# Patient Record
Sex: FEMALE | Race: WHITE | NOT HISPANIC OR LATINO | Employment: FULL TIME | URBAN - METROPOLITAN AREA
[De-identification: names, ages, dates, MRNs, and addresses within clinical notes are randomized per-mention and may not be internally consistent; named-entity substitution may affect disease eponyms.]

---

## 2023-05-17 PROBLEM — F33.8 SEASONAL DEPRESSION (CMS-HCC): Status: ACTIVE | Noted: 2023-05-17

## 2023-05-17 PROBLEM — M54.2 NECK PAIN ON RIGHT SIDE: Status: ACTIVE | Noted: 2023-05-17

## 2023-05-17 PROBLEM — F42.8 OBSESSIVE THINKING: Status: ACTIVE | Noted: 2023-05-17

## 2023-05-17 PROBLEM — E04.9 ENLARGED THYROID GLAND: Status: ACTIVE | Noted: 2023-05-17

## 2023-05-17 PROBLEM — R41.3 MEMORY DEFICIT: Status: ACTIVE | Noted: 2023-05-17

## 2023-05-17 PROBLEM — G89.29 CHRONIC LOWER BACK PAIN: Status: ACTIVE | Noted: 2023-05-17

## 2023-05-17 PROBLEM — R00.0 SINUS TACHYCARDIA: Status: ACTIVE | Noted: 2023-05-17

## 2023-05-17 PROBLEM — N63.10 MASS OF RIGHT BREAST: Status: ACTIVE | Noted: 2023-05-17

## 2023-05-17 PROBLEM — R51.9 HEAD PAIN: Status: ACTIVE | Noted: 2023-05-17

## 2023-05-17 PROBLEM — I72.0: Status: ACTIVE | Noted: 2023-05-17

## 2023-05-17 PROBLEM — R63.4 WEIGHT LOSS, UNINTENTIONAL: Status: ACTIVE | Noted: 2023-05-17

## 2023-05-17 PROBLEM — R79.89 ABNORMAL TSH: Status: ACTIVE | Noted: 2023-05-17

## 2023-05-17 PROBLEM — E05.10 TOXIC ADENOMA: Status: ACTIVE | Noted: 2023-05-17

## 2023-05-17 PROBLEM — M54.12 CERVICAL RADICULOPATHY: Status: ACTIVE | Noted: 2023-05-17

## 2023-05-17 PROBLEM — F41.9 ANXIETY: Status: ACTIVE | Noted: 2023-05-17

## 2023-05-17 PROBLEM — H61.20 CERUMEN IMPACTION: Status: ACTIVE | Noted: 2023-05-17

## 2023-05-17 PROBLEM — M54.12 CERVICAL NEURITIS: Status: ACTIVE | Noted: 2023-05-17

## 2023-05-17 PROBLEM — R29.898 RIGHT ARM WEAKNESS: Status: ACTIVE | Noted: 2023-05-17

## 2023-05-17 PROBLEM — R11.2 DRUG-INDUCED NAUSEA AND VOMITING: Status: ACTIVE | Noted: 2023-05-17

## 2023-05-17 PROBLEM — R21 RASH: Status: ACTIVE | Noted: 2023-05-17

## 2023-05-17 PROBLEM — E04.1 THYROID NODULE: Status: ACTIVE | Noted: 2023-05-17

## 2023-05-17 PROBLEM — R20.9 SENSORY DISTURBANCE: Status: ACTIVE | Noted: 2023-05-17

## 2023-05-17 PROBLEM — M54.50 CHRONIC LOWER BACK PAIN: Status: ACTIVE | Noted: 2023-05-17

## 2023-05-17 PROBLEM — M79.672 PAIN OF LEFT HEEL: Status: ACTIVE | Noted: 2023-05-17

## 2023-05-17 PROBLEM — E05.90 HYPERTHYROIDISM: Status: ACTIVE | Noted: 2023-05-17

## 2023-05-17 PROBLEM — G47.9 SLEEP DIFFICULTIES: Status: ACTIVE | Noted: 2023-05-17

## 2023-05-17 PROBLEM — L65.9 HAIR LOSS: Status: ACTIVE | Noted: 2023-05-17

## 2023-05-17 PROBLEM — T50.905A DRUG-INDUCED NAUSEA AND VOMITING: Status: ACTIVE | Noted: 2023-05-17

## 2023-05-17 PROBLEM — D64.9 ANEMIA: Status: ACTIVE | Noted: 2023-05-17

## 2023-05-17 PROBLEM — F33.9 MAJOR DEPRESSIVE DISORDER, RECURRENT EPISODE (CMS-HCC): Status: ACTIVE | Noted: 2023-05-17

## 2023-05-17 PROBLEM — R41.840 ATTENTION DEFICIT: Status: ACTIVE | Noted: 2023-05-17

## 2023-05-17 PROBLEM — R92.8 ABNORMAL MAMMOGRAM: Status: ACTIVE | Noted: 2023-05-17

## 2023-05-22 ENCOUNTER — APPOINTMENT (OUTPATIENT)
Dept: PRIMARY CARE | Facility: CLINIC | Age: 48
End: 2023-05-22
Payer: COMMERCIAL

## 2023-09-21 ENCOUNTER — OFFICE VISIT (OUTPATIENT)
Dept: PRIMARY CARE | Facility: CLINIC | Age: 48
End: 2023-09-21
Payer: COMMERCIAL

## 2023-09-21 VITALS
OXYGEN SATURATION: 98 % | HEIGHT: 68 IN | DIASTOLIC BLOOD PRESSURE: 80 MMHG | WEIGHT: 170.8 LBS | BODY MASS INDEX: 25.88 KG/M2 | SYSTOLIC BLOOD PRESSURE: 110 MMHG | TEMPERATURE: 97.5 F | HEART RATE: 80 BPM | RESPIRATION RATE: 16 BRPM

## 2023-09-21 DIAGNOSIS — J02.9 SORE THROAT: ICD-10-CM

## 2023-09-21 DIAGNOSIS — R68.89 FLU-LIKE SYMPTOMS: ICD-10-CM

## 2023-09-21 DIAGNOSIS — R13.10 PAINFUL SWALLOWING: ICD-10-CM

## 2023-09-21 DIAGNOSIS — J02.9 TONSILLOPHARYNGITIS: Primary | ICD-10-CM

## 2023-09-21 LAB — POC RAPID STREP: NEGATIVE

## 2023-09-21 PROCEDURE — 87081 CULTURE SCREEN ONLY: CPT

## 2023-09-21 PROCEDURE — 87880 STREP A ASSAY W/OPTIC: CPT | Performed by: NURSE PRACTITIONER

## 2023-09-21 PROCEDURE — 87636 SARSCOV2 & INF A&B AMP PRB: CPT

## 2023-09-21 PROCEDURE — 99202 OFFICE O/P NEW SF 15 MIN: CPT | Performed by: NURSE PRACTITIONER

## 2023-09-21 RX ORDER — DOCUSATE CALCIUM 240 MG
CAPSULE ORAL
COMMUNITY
End: 2023-10-19 | Stop reason: ALTCHOICE

## 2023-09-21 RX ORDER — ASPIRIN 81 MG/1
1 TABLET ORAL DAILY
COMMUNITY
End: 2024-04-03 | Stop reason: WASHOUT

## 2023-09-21 RX ORDER — AMOXICILLIN 500 MG/1
500 CAPSULE ORAL EVERY 8 HOURS SCHEDULED
Qty: 21 CAPSULE | Refills: 0 | Status: SHIPPED | OUTPATIENT
Start: 2023-09-21 | End: 2023-09-21

## 2023-09-21 RX ORDER — TRAZODONE HYDROCHLORIDE 50 MG/1
TABLET ORAL
COMMUNITY
Start: 2021-11-04

## 2023-09-21 RX ORDER — SERTRALINE HYDROCHLORIDE 50 MG/1
1.5 TABLET, FILM COATED ORAL DAILY
COMMUNITY
Start: 2022-09-29 | End: 2023-10-19 | Stop reason: DRUGHIGH

## 2023-09-21 RX ORDER — NAPROXEN SODIUM 220 MG
TABLET ORAL
COMMUNITY
Start: 2017-02-20 | End: 2023-10-19 | Stop reason: ALTCHOICE

## 2023-09-21 RX ORDER — SERTRALINE HYDROCHLORIDE 100 MG/1
1.5 TABLET, FILM COATED ORAL DAILY
COMMUNITY
Start: 2022-12-15 | End: 2023-10-19

## 2023-09-21 RX ORDER — BUPROPION HYDROCHLORIDE 300 MG/1
1 TABLET ORAL DAILY
COMMUNITY
Start: 2022-01-06 | End: 2023-10-19

## 2023-09-21 RX ORDER — CYCLOSPORINE 0.5 MG/ML
EMULSION OPHTHALMIC
COMMUNITY
Start: 2015-03-12

## 2023-09-21 ASSESSMENT — ENCOUNTER SYMPTOMS
LOSS OF SENSATION IN FEET: 0
COUGH: 1
HEADACHES: 1
DEPRESSION: 0
HOARSE VOICE: 1
TROUBLE SWALLOWING: 1
SWOLLEN GLANDS: 1
SORE THROAT: 1
OCCASIONAL FEELINGS OF UNSTEADINESS: 0

## 2023-09-21 ASSESSMENT — PATIENT HEALTH QUESTIONNAIRE - PHQ9
1. LITTLE INTEREST OR PLEASURE IN DOING THINGS: NOT AT ALL
2. FEELING DOWN, DEPRESSED OR HOPELESS: NOT AT ALL
SUM OF ALL RESPONSES TO PHQ9 QUESTIONS 1 AND 2: 0
1. LITTLE INTEREST OR PLEASURE IN DOING THINGS: NOT AT ALL
2. FEELING DOWN, DEPRESSED OR HOPELESS: NOT AT ALL
SUM OF ALL RESPONSES TO PHQ9 QUESTIONS 1 AND 2: 0

## 2023-09-21 NOTE — PROGRESS NOTES
Subjective   Patient ID: Suzy Bah is a 48 y.o. female who is with chief complaint of sore throat.    HPI  Patient is a 48 y.o. female who CONSULTED AT [facility] [place] [place] [facility] today. Patient is with complaints of slight nasal congestion, slight nasal discharge, headache / sinus pain, sore throat, painful swallowing, red swollen tonsils, slight cough, post nasal drip, and fatigue. She denies having muscle ache, loss of sense of taste, loss of sense of smell, diarrhea, chills nor fever. Patient states that present condition started yesterday. Patient denies history of recent travel, exposure to person/people who tested positive for COVID 19, nor exposure to person/people with flu like symptoms. she denies shortness of breath, chest pain, palpitations, nor edema. she stated that she  tried OTC medications which afforded only slight relief of symptoms. she denies nausea, vomiting, abdominal pain, nor any other symptoms.    Patient states she had her COVID vaccine.  Patient states she had the flu shot for this season.  Patient states she underwent testing for COVID yesterday and the result was NEGATIVE.     Review of Systems  General: no weight loss, generally healthy, (+) fatigue  Head: (+) headaches / sinus pain, no vertigo, no injury  Eyes: no diplopia, no tearing, no pain,   Ears: no change in hearing, no tinnitus, no bleeding, no vertigo  Mouth:  no dental difficulties, no gingival bleeding, (+) sore throat, no loss of sense of taste, (+) painful swallowing, (+) red swollen tonsils,   Nose: (+) mild congestion, (+) mild discharge, no bleeding, no obstruction, no loss of sense of smell  Neck: no stiffness, no pain, no tenderness, no masses, no bruit  Pulmonary: no dyspnea, no wheezing, no hemoptysis, no cough  Cardiovascular: no chest pain, no palpitations, no syncope, no orthopnea  Gastrointestinal: no change in appetite, no dysphagia, no abdominal pains, no diarrhea, no emesis, no melena  Genito  Urinary: no dysuria, no urinary urgency, no nocturia, no incontinence, no change in nature of urine  Musculoskeletal: no muscle ache, no joint pain, no limitation of range of motion, no paresthesia, no numbness  Constitutional: no fever, no chills, no night sweats    Objective   Physical Exam  General: ambulatory, in no acute distress  Head: normocephalic, no lesions, no sinus tenderness  Eyes: pink palpebral conjunctiva, anicteric sclerae, PERRLA, EOM's full  Ears: clear external auditory canals, no ear discharge, no bleeding from the ears, tympanic membrane intact  Nose: no congestion of nasal mucosa, no nasal discharge, no bleeding, no obstruction  Throat: (+) erythema, and (+) exudate on posterior pharyngeal wall, no lesion, (+) erythema and enlargement of both tonsils  Neck: supple, no masses, no bruits, no CLADP  Chest: symmetrical chest expansion, no lagging, no retractions, clear breath sounds, no rales, no wheezes  Extremities: full and equal peripheral pulses, no edema,    Assessment/Plan   Problem List Items Addressed This Visit    None  Visit Diagnoses       Tonsillopharyngitis    -  Primary    Relevant Medications    amoxicillin (Amoxil) 500 mg capsule    Other Relevant Orders    POCT rapid strep A manually resulted (Completed)    Group A Streptococcus, Culture    Flu-like symptoms        Relevant Orders    Influenza A, and B PCR    Sars-CoV-2 PCR, Symptomatic    Sore throat        Relevant Medications    amoxicillin (Amoxil) 500 mg capsule    Other Relevant Orders    POCT rapid strep A manually resulted (Completed)    Group A Streptococcus, Culture    Painful swallowing        Relevant Medications    amoxicillin (Amoxil) 500 mg capsule    Other Relevant Orders    POCT rapid strep A manually resulted (Completed)    Group A Streptococcus, Culture        Patient for COVID and flu testing.  Specimen collection done by MA  Patient tolerated procedure well  Will inform patient of result    rapid strep test  done  negative result discussed and explained to the patient  culture and sensitivity study of throat swab ordered and done  will call patient with result    DISCHARGE SUMMARY:   Patient was seen and examined. Diagnosis, treatment, treatment options, and possible complications of today's illness discussed and explained to patient. Patient to take medication/s associated with this visit. Patient may also take OTC analgesic/antipyretic if needed for pain/fever. Advised to increase oral fluid intake. Advised steam inhalation if needed to relieve congestion. Advised warm saline gargle if needed to relieve throat discomfort. Advised Listerine antiseptic mouthwash gargle TID. Patient may use Cepacol oral spray as needed to relieve throat discomfort. Patient was advised to discard the old toothbrush and use a new toothbrush beginning on the third of antibiotics. Advised to follow instructions with regards to COVID testing. Advised on social distancing and communicability prevention. Advised to come back if with worsening or persistent symptoms. Patient verbalized understanding of plan of care.    Patient to come back in 7 - 10 days if needed for worsening symptoms.

## 2023-09-21 NOTE — PATIENT INSTRUCTIONS
DISCHARGE SUMMARY:   Patient was seen and examined. Diagnosis, treatment, treatment options, and possible complications of today's illness discussed and explained to patient. Patient to take medication/s associated with this visit. Patient may also take OTC analgesic/antipyretic if needed for pain/fever. Advised to increase oral fluid intake. Advised steam inhalation if needed to relieve congestion. Advised warm saline gargle if needed to relieve throat discomfort. Advised Listerine antiseptic mouthwash gargle TID. Patient may use Cepacol oral spray as needed to relieve throat discomfort. Patient was advised to discard the old toothbrush and use a new toothbrush beginning on the third of antibiotics. Advised to follow instructions with regards to COVID testing. Advised on social distancing and communicability prevention. Advised to come back if with worsening or persistent symptoms. Patient verbalized understanding of plan of care.    Patient to come back in 7 - 10 days if needed for worsening symptoms.

## 2023-09-21 NOTE — PROGRESS NOTES
"Subjective   Patient ID: Suzy Bah is a 48 y.o. female who presents for Sore Throat.    Pt claims she did a at home COVID test it came back neg. 9/20/2023    Sore Throat   This is a new problem. The current episode started in the past 7 days. The problem has been gradually worsening. There has been no fever. Associated symptoms include congestion, coughing, ear pain, headaches, a hoarse voice, swollen glands and trouble swallowing. She has tried nothing for the symptoms. The treatment provided no relief.        Review of Systems   HENT:  Positive for congestion, ear pain, hoarse voice, sore throat and trouble swallowing.    Respiratory:  Positive for cough.    Neurological:  Positive for headaches.       Objective   /80   Pulse 80   Temp 36.4 °C (97.5 °F) (Temporal)   Resp 16   Ht 1.715 m (5' 7.5\")   Wt 77.5 kg (170 lb 12.8 oz)   SpO2 98%   BMI 26.36 kg/m²     Physical Exam    Assessment/Plan          "

## 2023-09-22 LAB
FLU A RESULT: NOT DETECTED
FLU B RESULT: NOT DETECTED
SARS-COV-2 RESULT: NOT DETECTED

## 2023-09-23 LAB — GROUP A STREP SCREEN, CULTURE: NORMAL

## 2023-09-26 ENCOUNTER — DOCUMENTATION (OUTPATIENT)
Dept: PRIMARY CARE | Facility: CLINIC | Age: 48
End: 2023-09-26
Payer: COMMERCIAL

## 2023-10-05 ENCOUNTER — HOSPITAL ENCOUNTER (OUTPATIENT)
Dept: CARDIOLOGY | Facility: HOSPITAL | Age: 48
Discharge: HOME | End: 2023-10-05
Payer: COMMERCIAL

## 2023-10-05 DIAGNOSIS — I72.0 ANEURYSM OF LEFT CAROTID ARTERY (CMS-HCC): ICD-10-CM

## 2023-10-05 DIAGNOSIS — I67.1 CEREBRAL ANEURYSM, NONRUPTURED (HHS-HCC): ICD-10-CM

## 2023-10-05 PROCEDURE — 93880 EXTRACRANIAL BILAT STUDY: CPT | Performed by: INTERNAL MEDICINE

## 2023-10-05 PROCEDURE — 93880 EXTRACRANIAL BILAT STUDY: CPT

## 2023-10-11 ENCOUNTER — PHARMACY VISIT (OUTPATIENT)
Dept: PHARMACY | Facility: CLINIC | Age: 48
End: 2023-10-11
Payer: COMMERCIAL

## 2023-10-11 PROCEDURE — RXMED WILLOW AMBULATORY MEDICATION CHARGE

## 2023-10-18 PROBLEM — F32.A DEPRESSION: Status: ACTIVE | Noted: 2023-10-18

## 2023-10-18 PROBLEM — Z86.79 HISTORY OF ANEURYSM: Status: ACTIVE | Noted: 2023-10-18

## 2023-10-18 PROBLEM — L82.1 OTHER SEBORRHEIC KERATOSIS: Status: ACTIVE | Noted: 2019-12-17

## 2023-10-18 PROBLEM — B07.9 VIRAL WART, UNSPECIFIED: Status: ACTIVE | Noted: 2019-12-17

## 2023-10-18 PROBLEM — G43.709 CHRONIC MIGRAINE WITHOUT AURA: Status: ACTIVE | Noted: 2023-10-18

## 2023-10-18 PROBLEM — L73.8 OTHER SPECIFIED FOLLICULAR DISORDERS: Status: ACTIVE | Noted: 2019-12-17

## 2023-10-18 PROBLEM — L65.9 NONSCARRING HAIR LOSS, UNSPECIFIED: Status: ACTIVE | Noted: 2019-12-17

## 2023-10-18 RX ORDER — TRETINOIN 0.25 MG/G
1 CREAM TOPICAL
COMMUNITY
Start: 2019-12-17 | End: 2023-10-19 | Stop reason: ALTCHOICE

## 2023-10-18 RX ORDER — FLAXSEED OIL 1000 MG
1 CAPSULE ORAL DAILY
COMMUNITY
Start: 2017-02-20 | End: 2023-10-19 | Stop reason: ALTCHOICE

## 2023-10-18 RX ORDER — BUPROPION HYDROCHLORIDE 150 MG/1
TABLET ORAL
COMMUNITY
Start: 2023-03-09 | End: 2023-10-19 | Stop reason: DRUGHIGH

## 2023-10-18 RX ORDER — ATOMOXETINE 40 MG/1
CAPSULE ORAL
COMMUNITY
Start: 2023-02-23 | End: 2023-10-19

## 2023-10-19 ENCOUNTER — TELEMEDICINE (OUTPATIENT)
Dept: BEHAVIORAL HEALTH | Facility: CLINIC | Age: 48
End: 2023-10-19
Payer: COMMERCIAL

## 2023-10-19 DIAGNOSIS — F42.8 OBSESSIVE THINKING: ICD-10-CM

## 2023-10-19 DIAGNOSIS — F33.1 MODERATE EPISODE OF RECURRENT MAJOR DEPRESSIVE DISORDER (MULTI): ICD-10-CM

## 2023-10-19 DIAGNOSIS — F41.9 ANXIETY: ICD-10-CM

## 2023-10-19 DIAGNOSIS — F33.8 SEASONAL DEPRESSION (CMS-HCC): ICD-10-CM

## 2023-10-19 DIAGNOSIS — G47.9 SLEEP DIFFICULTIES: ICD-10-CM

## 2023-10-19 DIAGNOSIS — R41.3 MEMORY DEFICIT: ICD-10-CM

## 2023-10-19 PROBLEM — F32.A DEPRESSION: Status: RESOLVED | Noted: 2023-10-18 | Resolved: 2023-10-19

## 2023-10-19 PROCEDURE — 99214 OFFICE O/P EST MOD 30 MIN: CPT | Performed by: NURSE PRACTITIONER

## 2023-10-19 ASSESSMENT — PATIENT HEALTH QUESTIONNAIRE - PHQ9
1. LITTLE INTEREST OR PLEASURE IN DOING THINGS: MORE THAN HALF THE DAYS
6. FEELING BAD ABOUT YOURSELF - OR THAT YOU ARE A FAILURE OR HAVE LET YOURSELF OR YOUR FAMILY DOWN: NOT AT ALL
8. MOVING OR SPEAKING SO SLOWLY THAT OTHER PEOPLE COULD HAVE NOTICED. OR THE OPPOSITE, BEING SO FIGETY OR RESTLESS THAT YOU HAVE BEEN MOVING AROUND A LOT MORE THAN USUAL: SEVERAL DAYS
9. THOUGHTS THAT YOU WOULD BE BETTER OFF DEAD, OR OF HURTING YOURSELF: NOT AT ALL
10. IF YOU CHECKED OFF ANY PROBLEMS, HOW DIFFICULT HAVE THESE PROBLEMS MADE IT FOR YOU TO DO YOUR WORK, TAKE CARE OF THINGS AT HOME, OR GET ALONG WITH OTHER PEOPLE: VERY DIFFICULT
4. FEELING TIRED OR HAVING LITTLE ENERGY: NEARLY EVERY DAY
7. TROUBLE CONCENTRATING ON THINGS, SUCH AS READING THE NEWSPAPER OR WATCHING TELEVISION: SEVERAL DAYS
2. FEELING DOWN, DEPRESSED OR HOPELESS: MORE THAN HALF THE DAYS
5. POOR APPETITE OR OVEREATING: SEVERAL DAYS
3. TROUBLE FALLING OR STAYING ASLEEP OR SLEEPING TOO MUCH: MORE THAN HALF THE DAYS

## 2023-10-19 ASSESSMENT — ANXIETY QUESTIONNAIRES
1. FEELING NERVOUS, ANXIOUS, OR ON EDGE: NOT AT ALL
6. BECOMING EASILY ANNOYED OR IRRITABLE: SEVERAL DAYS
7. FEELING AFRAID AS IF SOMETHING AWFUL MIGHT HAPPEN: NOT AT ALL
4. TROUBLE RELAXING: NOT AT ALL
3. WORRYING TOO MUCH ABOUT DIFFERENT THINGS: SEVERAL DAYS
GAD7 TOTAL SCORE: 3
5. BEING SO RESTLESS THAT IT IS HARD TO SIT STILL: NOT AT ALL
IF YOU CHECKED OFF ANY PROBLEMS ON THIS QUESTIONNAIRE, HOW DIFFICULT HAVE THESE PROBLEMS MADE IT FOR YOU TO DO YOUR WORK, TAKE CARE OF THINGS AT HOME, OR GET ALONG WITH OTHER PEOPLE: SOMEWHAT DIFFICULT
2. NOT BEING ABLE TO STOP OR CONTROL WORRYING: SEVERAL DAYS

## 2023-10-19 ASSESSMENT — ENCOUNTER SYMPTOMS
CARDIOVASCULAR NEGATIVE: 1
RESPIRATORY NEGATIVE: 1
GASTROINTESTINAL NEGATIVE: 1
DYSPHORIC MOOD: 1

## 2023-10-19 NOTE — PROGRESS NOTES
"Adult Ambulatory Psychiatry Progress Note      Assessment/Plan     Impression:  Suzy Bah is a 48 y.o. 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     Plan: No changes to tx plan or meds. Open to referral to neurology for memory deficits and attention issues that are not improving. Will discuss at next appt about increasing Zoloft if seasonal depression becomes stronger.  Medication: Continues Zoloft 150mg every day, Trazodone 25-50mg PRN/HS, Wellbutrin XL 300mg every day.     Patient is reminded that if there is SI to call [phone] and get themselves to the closest ED for evaluation, otherwise contact me for other questions/concerns.     Diagnoses and all orders for this visit:  Anxiety  Moderate episode of recurrent major depressive disorder (CMS/HCC)  Obsessive thinking  Seasonal depression (CMS/HCC)  Memory deficit  Sleep difficulties      Therapy: none    Other: n/a    Patient is reminded that if there is SI to call [phone] and get themselves to the closest ED for evaluation, otherwise contact me for other questions/concerns.     Subjective   HPI:  \"Both the patient, and family and caregivers and guardians as appropriate, were informed of the current need to conduct treatment via telephone. I have confirmed the patient's identity via the following (minimum of three) acceptable identifiers as per  Policy PH-9: , SS, home address.\"     Present Illness - anxiety, depression     Characteristics/Recent psychiatric symptoms (pertinent positives and negatives) - reports anxiety has been going 'better and managed' even with the EPIC conversion process finally done and implemented at work. Admits the first week was stressful but was able to make it through the first 2 weeks and as everyone in the department get " along well, there is support amongst one another and everyone feels comfortable asking each other for help when needed. Reports the depression 'had been ok with some days being different, and some days being better and other days being dark, but overall not horrible.' Reports noticing the cooler weather and rain for the past week has started to compound the seasonal depression feeling, but uses her natural light lamp when waking up. Reports sleep over the weekend was 'horrible where I did sleep until 2 in the afternoon.' Admits waking up in the AM, is 'not good at all. I do stick to my sleep routine. My brain won't turn off, and I do try to keep reading a book until I get sleepy, put it down, but then if I can't fall asleep because  of the obsessive thoughts, then I will open up the book again.' Reports 2-3 nights is when she is having issues falling asleep for the past 2-3 weeks, and is getting 6-7 hours. Most weekends she does get 8-9 hours but last weekend because of feeling unmotivated, mentally exhausted (having run out of her Wellbutrin and did not get to the pharmacy to refill it), was sleeping 10-12 hours. Reports energy levels continue to remain low and still experiences mental and physical fatigue but did notice without the Wellbutrin, they were worse than before, 'but now they are evening out.' Appetite is 'normal.' Reports 'no changes with memory issues and attention concerns' - no better, no worse. Reports her issues with memory are long term and short term recall. Also notices issues with forgetting words and expressing them in the moment. Reports 'not too bad during the daytime' with dealing with the mix of racing, obsessive, or any intrusive thoughts.      Onset/timeframe - 3 weeks  Type - anxiety, seasonal depression  Duration - situational  Aggravating and/or relieving factors/triggers - start of EPIC changeover, and changes in weather/season, but got worse over the past weekend when she ran out of  her Wellbutrin and did make it on time to the pharmacy to get it refilled, so only 2 days ago restarted it and feeling her mood re stabilize now.  Related symptoms  Treatment and treatment changes (new meds, dosage increases or decreases, med compliance, therapy frequency, etc.) (Past and Recent) - Zoloft 150mg QD, Trazodone 25-50mg PRN, Wellbutrin XL 300mg QD. Still looking for therapist.     Issues: Denies SI/HI/AVH currently.          Review of Systems   HENT: Negative.     Respiratory: Negative.     Cardiovascular: Negative.    Gastrointestinal: Negative.    Psychiatric/Behavioral:  Positive for dysphoric mood.          Objective   Mental Status Exam:  General Appearance: Well groomed, appropriate eye contact  Attitude/Behavior: Cooperative  Motor: No psychomotor agitation or retardation, no tremor or other abnormal movements  Speech: Normal rate, volume, prosody  Gait/Station: Other:(comment) (sitting at work desk over ZOOM)  Mood: 'doing alright if not feeling a little better now'  Affect: Euthymic, full-range, Congruent with mood and topic of conversation  Thought Process: Linear, goal directed  Thought Associations: No loosening of associations  Thought Content: Normal  Perception: No perceptual abnormalities noted  Sensorium: Alert and oriented to person, place, time and situation  Insight: Intact  Judgement: Intact  Cognition: Cognitively intact to conversational testing with respect to attention, orientation, fund of knowledge, recent and remote memory, and language  Testing: N/A    TRACI-7/PHQ-9 scores reviewed: 3, 12 compared to 4, 10 reflecting improved anxiety but a minor bounce in depressive symptoms.    Current Medications:  Current Outpatient Medications on File Prior to Visit   Medication Sig Dispense Refill    aspirin 81 mg EC tablet Take 1 tablet (81 mg) by mouth once daily.      ASPIRIN-ACETAMINOPHEN-CAFFEINE ORAL Take by mouth if needed. for migraine      buPROPion XL (Wellbutrin XL) 300 mg 24  hr tablet TAKE 1 TABLET BY MOUTH ONCE DAILY 90 tablet 3    Restasis 0.05 % ophthalmic emulsion Administer into affected eye(s).      sertraline (Zoloft) 100 mg tablet TAKE 1 1/2 TABLETS BY MOUTH ONCE DAILY 135 tablet 3    traZODone (Desyrel) 50 mg tablet Take by mouth.      [DISCONTINUED] docusate calcium (Stool Softener, docusate brian,) 240 mg capsule Take by mouth.      [DISCONTINUED] naproxen sodium (Aleve) 220 mg tablet Take by mouth.      BIOTIN ORAL Take 10,000 Units by mouth once daily.      [DISCONTINUED] amoxicillin (Amoxil) 500 mg capsule TAKE 1 CAPSULE (500 MG) BY MOUTH EVERY 8 HOURS FOR 7 DAYS. (Patient not taking: Reported on 10/19/2023) 21 capsule 0    [DISCONTINUED] atomoxetine (Strattera) 40 mg capsule       [DISCONTINUED] buPROPion XL (Wellbutrin XL) 150 mg 24 hr tablet       [DISCONTINUED] buPROPion XL (Wellbutrin XL) 300 mg 24 hr tablet Take 1 tablet (300 mg) by mouth once daily.      [DISCONTINUED] flaxseed oiL 1,000 mg capsule Take 1 capsule (1,000 mg) by mouth once daily.      [DISCONTINUED] FLAXSEED ORAL Take 1,000 mg by mouth once daily.      [DISCONTINUED] multivit-min/iron/folic acid/K (MULTI-DAY PLUS MINERALS ORAL) Take 1 tablet by mouth once daily.      [DISCONTINUED] sertraline (Zoloft) 100 mg tablet Take 1.5 tablets (150 mg) by mouth once daily.      [DISCONTINUED] sertraline (Zoloft) 50 mg tablet Take 1.5 tablets (75 mg) by mouth once daily.      [DISCONTINUED] tretinoin (Retin-A) 0.025 % cream 1 Application.       No current facility-administered medications on file prior to visit.       Lab Review:   not applicable      Time Spent:    Prep time: 1 min.  Direct patient time: 27 min.  Documentation time: 6 min.  Total time: 34 min.    Next Appointment:  Follow up in about 2 months (around 12/19/2023).

## 2023-10-20 ENCOUNTER — OFFICE VISIT (OUTPATIENT)
Dept: VASCULAR SURGERY | Facility: CLINIC | Age: 48
End: 2023-10-20
Payer: COMMERCIAL

## 2023-10-20 VITALS
WEIGHT: 169 LBS | DIASTOLIC BLOOD PRESSURE: 78 MMHG | SYSTOLIC BLOOD PRESSURE: 118 MMHG | HEIGHT: 68 IN | BODY MASS INDEX: 25.61 KG/M2 | HEART RATE: 102 BPM

## 2023-10-20 DIAGNOSIS — I72.0 ANEURYSM OF LEFT CAROTID ARTERY (CMS-HCC): Primary | ICD-10-CM

## 2023-10-20 PROCEDURE — 1036F TOBACCO NON-USER: CPT | Performed by: SURGERY

## 2023-10-20 PROCEDURE — 99213 OFFICE O/P EST LOW 20 MIN: CPT | Performed by: SURGERY

## 2023-10-20 NOTE — PROGRESS NOTES
History Of Present Illness  Suzy Bah is a 48 y.o. female presenting for a surveillance evaluation of her left ICA aneurysm repair with Dr. Vance from 2016. Her last visit was in 2020. She denies any lateralizing symptoms. She continues to take aspirin daily.      Past Medical History  She has a past medical history of Personal history of other mental and behavioral disorders.    Surgical History  She has a past surgical history that includes Eye surgery (09/10/2013); Breast biopsy (09/10/2013); Lumbar laminectomy (09/10/2013); Other surgical history (11/04/2021); Other surgical history (11/04/2021); Lumbar laminectomy (12/04/2013); and MR angio head wo IV contrast (1/13/2017).     Social History  She reports that she has never smoked. She has never used smokeless tobacco. No history on file for alcohol use and drug use.    Family History  Family History   Problem Relation Name Age of Onset    Other (htn) Father      Cancer Maternal Grandmother      Alcohol abuse Maternal Grandfather      Cancer Maternal Grandfather      Diabetes Maternal Grandfather      Heart disease Maternal Grandfather      Diabetes Paternal Grandmother      Heart disease Paternal Grandmother      Other (htn) Paternal Grandmother      Other (htn) Paternal Grandfather          Allergies  Patient has no known allergies.    Review of Systems   Constitutional: Negative.    HENT: Negative.     Eyes: Negative.    Respiratory:  Negative for cough and shortness of breath.    Cardiovascular:  Negative for chest pain and leg swelling.   Gastrointestinal: Negative.    Endocrine: Negative.    Genitourinary: Negative.    Musculoskeletal:  Negative for back pain and gait problem.   Skin:  Negative for color change, pallor and wound.   Neurological:  Negative for dizziness, syncope, speech difficulty, weakness, numbness and headaches.   Hematological:  Does not bruise/bleed easily.   Psychiatric/Behavioral: Negative.          Physical  "Exam  Constitutional:       General: She is not in acute distress.     Appearance: Normal appearance. She is normal weight.   HENT:      Head: Normocephalic and atraumatic.   Eyes:      Extraocular Movements: Extraocular movements intact.      Conjunctiva/sclera: Conjunctivae normal.      Pupils: Pupils are equal, round, and reactive to light.   Neck:      Vascular: No carotid bruit.   Cardiovascular:      Rate and Rhythm: Normal rate and regular rhythm.      Pulses: Normal pulses.      Heart sounds: Normal heart sounds.   Pulmonary:      Effort: Pulmonary effort is normal.      Breath sounds: Normal breath sounds.   Abdominal:      General: Abdomen is flat. Bowel sounds are normal.      Palpations: Abdomen is soft.   Musculoskeletal:         General: No swelling. Normal range of motion.      Cervical back: Normal range of motion. No tenderness.   Skin:     General: Skin is warm and dry.   Neurological:      General: No focal deficit present.      Mental Status: She is alert and oriented to person, place, and time.      Cranial Nerves: No cranial nerve deficit.      Sensory: No sensory deficit.      Motor: No weakness.   Psychiatric:         Mood and Affect: Mood normal.         Behavior: Behavior normal.          Last Recorded Vitals  Blood pressure 118/78, pulse 102, height 1.727 m (5' 8\"), weight 76.7 kg (169 lb).    Relevant Results      Current Outpatient Medications:     aspirin 81 mg EC tablet, Take 1 tablet (81 mg) by mouth once daily., Disp: , Rfl:     ASPIRIN-ACETAMINOPHEN-CAFFEINE ORAL, Take by mouth if needed. for migraine, Disp: , Rfl:     buPROPion XL (Wellbutrin XL) 300 mg 24 hr tablet, TAKE 1 TABLET BY MOUTH ONCE DAILY, Disp: 90 tablet, Rfl: 3    Restasis 0.05 % ophthalmic emulsion, Administer into affected eye(s)., Disp: , Rfl:     sertraline (Zoloft) 100 mg tablet, TAKE 1 1/2 TABLETS BY MOUTH ONCE DAILY, Disp: 135 tablet, Rfl: 3    traZODone (Desyrel) 50 mg tablet, Take by mouth., Disp: , Rfl:     " BIOTIN ORAL, Take 10,000 Units by mouth once daily., Disp: , Rfl:      Vascular US carotid artery duplex bilateral    Result Date: 10/6/2023            [facility] [place] [id number] [address] [place] [address]     Tel [phone] Fax [phone]  Vascular Lab Report  Carotid Artery Duplex Ultrasound Patient Name:      JHONNY KINNEY      Reading Physician:  [id number] Meredith Palacios MD, RPVI Study Date:        10/5/2023           Ordering Provider:  [id number] JASMYNE BRIDGES MRN/PID:           [id number]            Fellow: Accession#:        [id number]        Technologist:       TASHI Date of Birth/Age: 1975 / 48 years Technologist 2: Gender:            F                   Encounter#:         [id number] Admission Status:  Outpatient          Location Performed: [facility]  Diagnosis/ICD: Cerebral aneurysm, nonruptured-I67.1  Pertinent History: Hx LICA aneurysm repair 2017.  CONCLUSIONS:  Right Carotid: Findings are consistent with less than 50% stenosis of the right proximal internal carotid artery. Laminar flow seen by color Doppler. Right external carotid artery appears patent with no evidence of stenosis. The right vertebral artery is patent with antegrade flow. No evidence of hemodynamically significant stenosis in the right subclavian artery. Left Carotid: Findings are consistent with less than 50% stenosis of the left proximal internal carotid artery. Laminar flow seen by color Doppler. Left ICA transverse diameter 0.75 x 0.76 mm at bifurcation consistent with mild ectasia. Left external carotid artery appears patent with no evidence of stenosis. The left vertebral artery is patent with antegrade flow. No evidence of hemodynamically significant stenosis in the left subclavian artery.  Comparison: Compared with study from 10/19/2020, no significant change.Persistent mild carotid  ectasia. Imaging & Doppler Findings:  Right Plaque Morph: No plaque identified in the right carotid artery.  Left Plaque Morph: The distal left common carotid artery demonstrates intimal thickening plaque.   Right                        Left   PSV      EDV                PSV      EDV 99 cm/s  21 cm/s   CCA P    82 cm/s  24 cm/s 103 cm/s 19 cm/s   CCA D    73 cm/s  24 cm/s 93 cm/s  29 cm/s   ICA P    69 cm/s  30 cm/s 100 cm/s 44 cm/s   ICA M    77 cm/s  36 cm/s 117 cm/s 40 cm/s   ICA D    81 cm/s  31 cm/s 101 cm/s 16 cm/s    ECA     57 cm/s  13 cm/s 46 cm/s  14 cm/s Vertebral  41 cm/s  13 cm/s 155 cm/s         Subclavian 132 cm/s                Right Left ICA/CCA Ratio  0.9  0.9   [id number] Meredith Palacios MD, RPVI Electronically signed by [id number] Meredith Palacios MD, RPVI on 10/6/2023 at 11:46:04 AM  ** Final **        Assessment/Plan   Diagnoses and all orders for this visit:  Aneurysm of left carotid artery (CMS/HCC)  -     Vascular US Carotid Artery Duplex Bilateral; Future    47yo female with h/o left ICA aneurysm repair. She is doing well and denies any carotid symptoms. Recent carotid duplex reveals patent ICA's bilaterally with no e/o aneurysm or ectasia. She is to follow up in 2-3 years with a repeat carotid duplex.           Nata Aparicio MD.    Scribe Attestation  By signing my name below, I, Zurdo Wells   attest that this documentation has been prepared under the direction and in the presence of Nata Aparicio MD.

## 2023-10-21 ASSESSMENT — ENCOUNTER SYMPTOMS
CONSTITUTIONAL NEGATIVE: 1
BRUISES/BLEEDS EASILY: 0
DIZZINESS: 0
GASTROINTESTINAL NEGATIVE: 1
EYES NEGATIVE: 1
NUMBNESS: 0
SPEECH DIFFICULTY: 0
BACK PAIN: 0
HEADACHES: 0
COLOR CHANGE: 0
PSYCHIATRIC NEGATIVE: 1
SHORTNESS OF BREATH: 0
WEAKNESS: 0
COUGH: 0
ENDOCRINE NEGATIVE: 1
WOUND: 0

## 2023-10-26 ENCOUNTER — HOSPITAL ENCOUNTER (OUTPATIENT)
Dept: RADIOLOGY | Facility: HOSPITAL | Age: 48
Discharge: HOME | End: 2023-10-26
Payer: COMMERCIAL

## 2023-10-26 DIAGNOSIS — E05.10 THYROTOXICOSIS WITH TOXIC SINGLE THYROID NODULE WITHOUT THYROTOXIC CRISIS OR STORM: ICD-10-CM

## 2023-10-26 PROCEDURE — 76536 US EXAM OF HEAD AND NECK: CPT

## 2023-10-26 PROCEDURE — 76536 US EXAM OF HEAD AND NECK: CPT | Performed by: RADIOLOGY

## 2023-11-28 ENCOUNTER — LAB (OUTPATIENT)
Dept: LAB | Facility: LAB | Age: 48
End: 2023-11-28
Payer: COMMERCIAL

## 2023-11-28 DIAGNOSIS — E05.10 THYROTOXICOSIS WITH TOXIC SINGLE THYROID NODULE WITHOUT THYROTOXIC CRISIS OR STORM: Primary | ICD-10-CM

## 2023-11-28 LAB
T4 FREE SERPL-MCNC: 0.58 NG/DL (ref 0.61–1.12)
TSH SERPL-ACNC: 0.97 MIU/L (ref 0.44–3.98)

## 2023-11-28 PROCEDURE — 84481 FREE ASSAY (FT-3): CPT

## 2023-11-28 PROCEDURE — 84443 ASSAY THYROID STIM HORMONE: CPT

## 2023-11-28 PROCEDURE — 84439 ASSAY OF FREE THYROXINE: CPT

## 2023-11-28 PROCEDURE — 36415 COLL VENOUS BLD VENIPUNCTURE: CPT

## 2023-11-29 LAB — T3FREE SERPL-MCNC: 3.2 PG/ML (ref 2.3–4.2)

## 2023-11-30 ENCOUNTER — OFFICE VISIT (OUTPATIENT)
Dept: ENDOCRINOLOGY | Facility: CLINIC | Age: 48
End: 2023-11-30
Payer: COMMERCIAL

## 2023-11-30 VITALS
HEIGHT: 68 IN | WEIGHT: 170 LBS | DIASTOLIC BLOOD PRESSURE: 77 MMHG | SYSTOLIC BLOOD PRESSURE: 120 MMHG | BODY MASS INDEX: 25.76 KG/M2

## 2023-11-30 DIAGNOSIS — E55.9 VITAMIN D DEFICIENCY: ICD-10-CM

## 2023-11-30 DIAGNOSIS — E04.1 THYROID NODULE: ICD-10-CM

## 2023-11-30 DIAGNOSIS — E05.90 HYPERTHYROIDISM: Primary | ICD-10-CM

## 2023-11-30 PROCEDURE — 99214 OFFICE O/P EST MOD 30 MIN: CPT | Performed by: HOSPITALIST

## 2023-11-30 PROCEDURE — 1036F TOBACCO NON-USER: CPT | Performed by: HOSPITALIST

## 2023-11-30 RX ORDER — LANOLIN ALCOHOL/MO/W.PET/CERES
100 CREAM (GRAM) TOPICAL DAILY
COMMUNITY

## 2023-11-30 RX ORDER — IBUPROFEN 100 MG/5ML
1000 SUSPENSION, ORAL (FINAL DOSE FORM) ORAL DAILY
COMMUNITY
End: 2024-04-03 | Stop reason: WASHOUT

## 2023-11-30 ASSESSMENT — ENCOUNTER SYMPTOMS
TROUBLE SWALLOWING: 0
ABDOMINAL PAIN: 0
VOICE CHANGE: 0
FREQUENCY: 0
PALPITATIONS: 1
DYSURIA: 0
ARTHRALGIAS: 0
VOMITING: 0
AGITATION: 0
LIGHT-HEADEDNESS: 0
ABDOMINAL DISTENTION: 0
CHEST TIGHTNESS: 0
SORE THROAT: 0
HEADACHES: 0
EYE ITCHING: 0
NERVOUS/ANXIOUS: 1
NAUSEA: 0
DIARRHEA: 0
FATIGUE: 1
SHORTNESS OF BREATH: 0
TREMORS: 0
SLEEP DISTURBANCE: 0
CONSTIPATION: 0
PHOTOPHOBIA: 0

## 2023-11-30 NOTE — PROGRESS NOTES
Subjective   Patient ID: Suzy Bah is a 48 y.o. female who presents for New Patient Visit (Transition from Dr. Gutierrez) and Hyperthyroidism (PCP: Gross/Dx toxic adenoma 6mths to 1 year /[facility] thyroid: none known ).  HPI  See AP     Review of Systems   Constitutional:  Positive for fatigue.   HENT:  Negative for sore throat, trouble swallowing and voice change.    Eyes:  Negative for photophobia, itching and visual disturbance.   Respiratory:  Negative for chest tightness and shortness of breath.    Cardiovascular:  Positive for palpitations. Negative for chest pain.   Gastrointestinal:  Negative for abdominal distention, abdominal pain, constipation, diarrhea, nausea and vomiting.   Endocrine: Negative for cold intolerance, heat intolerance and polyuria.   Genitourinary:  Negative for dysuria and frequency.   Musculoskeletal:  Negative for arthralgias.   Skin:  Negative for pallor.   Allergic/Immunologic: Negative for environmental allergies.   Neurological:  Negative for tremors, light-headedness and headaches.   Psychiatric/Behavioral:  Negative for agitation and sleep disturbance. The patient is nervous/anxious.        Objective   Physical Exam  Constitutional:       Appearance: Normal appearance.   HENT:      Head: Normocephalic.      Comments: [place] positive      Nose: Nose normal.      Mouth/Throat:      Mouth: Mucous membranes are moist.   Eyes:      Extraocular Movements: Extraocular movements intact.   Neck:      Comments: Thyroid ~ 30g, nodular  Cardiovascular:      Rate and Rhythm: Normal rate.   Pulmonary:      Effort: Pulmonary effort is normal. No respiratory distress.   Abdominal:      General: There is no distension.   Musculoskeletal:         General: Normal range of motion.      Cervical back: Normal range of motion and neck supple.   Skin:     General: Skin is warm and dry.   Neurological:      Mental Status: She is alert and oriented to person, place, and time.      Deep Tendon Reflexes:  Reflexes normal.      Comments: No tremors    Psychiatric:         Mood and Affect: Mood normal.         Assessment/Plan   Diagnoses and all orders for this visit:  Hyperthyroidism  -     Thyroid Stimulating Hormone; Future  -     T4, free; Future  -     T3; Future  -     Thyroid stimulating immunoglobulin; Future  Thyroid nodule  -     US thyroid; Future  Vitamin D deficiency  -     Vitamin D 25-Hydroxy,Total (for eval of Vitamin D levels); Future         47 yo woman came to [facility] care for toxic adenoma.      She mentions she initially started having symptoms of hyperthyroidism early 2022- fatigue weight loss, palpitations.   Jan 2022 TSH 0.27 nl free T4 and T3   US thyroid 2/ 2022- [facility] with dominant left thyroid nodule ~ 2.9 cm   FNA biopsy left nodule- 3/ 2022- benign follicular nodule.   She saw Dr. Gutierrez 4/ 2022  UPTAKE scan 6 / 2022 - anterior neck uptake 27 %   Increased uptake mid pole left thyroid - likely hot nodule ( the one that was biopsied)    TSI ab neg   She mentions she was clinically observed , no BB or MMI or ROWE     Currently she mentions she fels well, palpitations once a week , it was daily in past   She mentions energy is fair. She has regular menstruations. Sleep - okay, no cramps .      10/ 2023- US thyroid -[facility] : R- 0.9 cm TR 3- not changed , left mid pole 1.4 cm TR 2 , no sig change, L inf lobe ~ 2.6 cm no sig change TR 4   11/28/23- TSH 0.97 free T4 0.58 T3- 3.2    No biotin use now. Stopped in past      Denies any eye symptoms   Clinically euthyroid      PLAN :   - discussed continued clinical and biochemical follow up   - advised HR monitoring when she has symptoms, hold off BB at this time   - discussed possible ROWE in case of symptoms of hyperthyroidism   - US thyroid 10/ 2024.   - discussed NH of toxic adenoma.     RTC  6m      FH- no known thyroid disorder   SH - lives in 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

## 2023-12-07 ENCOUNTER — TELEMEDICINE (OUTPATIENT)
Dept: BEHAVIORAL HEALTH | Facility: CLINIC | Age: 48
End: 2023-12-07
Payer: COMMERCIAL

## 2023-12-07 ENCOUNTER — LAB REQUISITION (OUTPATIENT)
Dept: LAB | Facility: HOSPITAL | Age: 48
End: 2023-12-07
Payer: COMMERCIAL

## 2023-12-07 DIAGNOSIS — F41.9 ANXIETY: ICD-10-CM

## 2023-12-07 DIAGNOSIS — F33.1 MODERATE EPISODE OF RECURRENT MAJOR DEPRESSIVE DISORDER (MULTI): ICD-10-CM

## 2023-12-07 DIAGNOSIS — G47.9 SLEEP DIFFICULTIES: ICD-10-CM

## 2023-12-07 DIAGNOSIS — R41.840 ATTENTION DEFICIT: ICD-10-CM

## 2023-12-07 DIAGNOSIS — F33.8 SEASONAL DEPRESSION (CMS-HCC): Primary | ICD-10-CM

## 2023-12-07 DIAGNOSIS — F42.8 OBSESSIVE THINKING: ICD-10-CM

## 2023-12-07 LAB — SARS-COV-2 RNA RESP QL NAA+PROBE: DETECTED

## 2023-12-07 PROCEDURE — 99214 OFFICE O/P EST MOD 30 MIN: CPT | Performed by: NURSE PRACTITIONER

## 2023-12-07 PROCEDURE — 87635 SARS-COV-2 COVID-19 AMP PRB: CPT

## 2023-12-07 RX ORDER — SERTRALINE HYDROCHLORIDE 100 MG/1
200 TABLET, FILM COATED ORAL DAILY
Qty: 240 TABLET | Refills: 0 | Status: SHIPPED | OUTPATIENT
Start: 2023-12-07 | End: 2024-05-12 | Stop reason: SDUPTHER

## 2023-12-07 ASSESSMENT — ENCOUNTER SYMPTOMS
CARDIOVASCULAR NEGATIVE: 1
GASTROINTESTINAL NEGATIVE: 1

## 2023-12-07 ASSESSMENT — PATIENT HEALTH QUESTIONNAIRE - PHQ9
3. TROUBLE FALLING OR STAYING ASLEEP OR SLEEPING TOO MUCH: SEVERAL DAYS
5. POOR APPETITE OR OVEREATING: SEVERAL DAYS
9. THOUGHTS THAT YOU WOULD BE BETTER OFF DEAD, OR OF HURTING YOURSELF: NOT AT ALL
8. MOVING OR SPEAKING SO SLOWLY THAT OTHER PEOPLE COULD HAVE NOTICED. OR THE OPPOSITE, BEING SO FIGETY OR RESTLESS THAT YOU HAVE BEEN MOVING AROUND A LOT MORE THAN USUAL: SEVERAL DAYS
1. LITTLE INTEREST OR PLEASURE IN DOING THINGS: MORE THAN HALF THE DAYS
10. IF YOU CHECKED OFF ANY PROBLEMS, HOW DIFFICULT HAVE THESE PROBLEMS MADE IT FOR YOU TO DO YOUR WORK, TAKE CARE OF THINGS AT HOME, OR GET ALONG WITH OTHER PEOPLE: SOMEWHAT DIFFICULT
7. TROUBLE CONCENTRATING ON THINGS, SUCH AS READING THE NEWSPAPER OR WATCHING TELEVISION: MORE THAN HALF THE DAYS
2. FEELING DOWN, DEPRESSED OR HOPELESS: MORE THAN HALF THE DAYS
6. FEELING BAD ABOUT YOURSELF - OR THAT YOU ARE A FAILURE OR HAVE LET YOURSELF OR YOUR FAMILY DOWN: SEVERAL DAYS
4. FEELING TIRED OR HAVING LITTLE ENERGY: NEARLY EVERY DAY

## 2023-12-07 ASSESSMENT — ANXIETY QUESTIONNAIRES
7. FEELING AFRAID AS IF SOMETHING AWFUL MIGHT HAPPEN: NOT AT ALL
3. WORRYING TOO MUCH ABOUT DIFFERENT THINGS: SEVERAL DAYS
1. FEELING NERVOUS, ANXIOUS, OR ON EDGE: NOT AT ALL
4. TROUBLE RELAXING: SEVERAL DAYS
6. BECOMING EASILY ANNOYED OR IRRITABLE: SEVERAL DAYS
IF YOU CHECKED OFF ANY PROBLEMS ON THIS QUESTIONNAIRE, HOW DIFFICULT HAVE THESE PROBLEMS MADE IT FOR YOU TO DO YOUR WORK, TAKE CARE OF THINGS AT HOME, OR GET ALONG WITH OTHER PEOPLE: SOMEWHAT DIFFICULT
GAD7 TOTAL SCORE: 3
2. NOT BEING ABLE TO STOP OR CONTROL WORRYING: NOT AT ALL
5. BEING SO RESTLESS THAT IT IS HARD TO SIT STILL: NOT AT ALL

## 2023-12-07 NOTE — PROGRESS NOTES
"Adult Ambulatory Psychiatry Progress Note      Assessment/Plan     Impression:  Suzy Bah is a 48 y.o. 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 At home on the weekends, with housework I have not been able to get anything done. On taiwo days, I feel happier and get things done but like today where it is gloomier out, I am just not motivated.\"    Plan: Reviewed and agreed to increasing Zoloft for SAD but leave other medications and treatment plan in place.    Medication: Increases Zoloft 150mg to 200mg every day. Trazodone 25-50mg PRN/HS, Wellbutrin XL 300mg every day.     Patient is reminded that if there is SI to call [phone] and get themselves to the closest ED for evaluation, otherwise contact me for other questions/concerns.     Diagnoses and all orders for this visit:  Seasonal depression (CMS/HCC)  -     sertraline (Zoloft) 100 mg tablet; Take 2 tablets (200 mg) by mouth once daily.  Anxiety  -     sertraline (Zoloft) 100 mg tablet; Take 2 tablets (200 mg) by mouth once daily.  Obsessive thinking  -     sertraline (Zoloft) 100 mg tablet; Take 2 tablets (200 mg) by mouth once daily.  Moderate episode of recurrent major depressive disorder (CMS/HCC)  Attention deficit  Sleep difficulties      Therapy: none    Other: n/a    Patient is reminded that if there is SI to call [phone] and get themselves to the closest ED for evaluation, otherwise contact me for other questions/concerns.     Subjective   HPI:  \"Both the patient, and family and caregivers and guardians as appropriate, were informed of the current need to conduct treatment via telephone. I have confirmed the patient's identity via the following (minimum of three) acceptable identifiers as per  Policy PH-9: , SS, home address.\"     Present Illness - depression     Characteristics/Recent psychiatric symptoms (pertinent " "positives and negatives) - reports anxiety has been stable overall and has no concerns. Depression is more impacted by the seasonal changes - colder and rozina out and the time change in the fall which has it darker out longer - has a lingering impact on her mood. Notices that when the sun is out she feels happier and more motivated to do things, especially on weekends and doing tasks at home, like cleaning up her house.  Reports the mixture of racing, obsessive thoughts are 'still there if not a bit less, and if I do experience them, I do try to move pass them and move on.' Reports those thoughts impact her after work and during her quiet time over dinner and showering before bed, \"I will find myself ruminating about things in the past, and thinking about what happened today and what to expect for tomorrow.\" Reports makes effort to stick to a sleep routine and with reading a book, 'it helps ease my mind' and will fall asleep within 20-30 minutes. Weeknights gets 5-6 hrs and weekends will get 8-10 hrs. Admits to not being a 'morning person' so her energy levels start out low and will eventually rise through the day and are sustainable for the day, but by the end of the day, they will plummet and she will feel herself mentally and physically fatigued. Appetite is 'ok. I am trying to eat better. I have gained a lot of weight - as I am trying to stay away from the desserts so trying to eat a little better.' Reports her memory issues and attention concerns 'are right now, not bad. They have not been so bad the past few weeks.'      Onset/timeframe - 4 weeks  Type - seasonal depression  Duration - daily  Aggravating and/or relieving factors/triggers - changes in weather/season, darker out, and notices how her mood improves when taiwo out   Related symptoms  Treatment and treatment changes (new meds, dosage increases or decreases, med compliance, therapy frequency, etc.) (Past and Recent) - Zoloft 150mg QD, Trazodone " 25-50mg PRN, Wellbutrin XL 300mg QD. Still looking for therapist.     Issues: Denies SI/HI/AVH currently.          Review of Systems   Cardiovascular: Negative.    Gastrointestinal: Negative.    Genitourinary: Negative.    Musculoskeletal: Negative.    Psychiatric/Behavioral: Negative.           Objective   Mental Status Exam:  General Appearance: Well groomed, appropriate eye contact  Attitude/Behavior: Cooperative  Motor: No psychomotor agitation or retardation, no tremor or other abnormal movements  Speech: Normal rate, volume, prosody  Gait/Station: WFL - Within functional limits  Mood: 'tired, if not a little down'  Affect: Constricted, Blunted, Congruent with mood and topic of conversation  Thought Process: Linear, goal directed  Thought Associations: No loosening of associations  Thought Content: Normal  Perception: No perceptual abnormalities noted  Sensorium: Alert and oriented to person, place, time and situation  Insight: Intact  Judgement: Intact  Cognition: Cognitively intact to conversational testing with respect to attention, orientation, fund of knowledge, recent and remote memory, and language  Testing: N/A    TRACI-7/PHQ-9 scores reviewed: 3, 12 compared to 3, 12 reflecting stable anxiety and moderate depression.    Current Medications:  Current Outpatient Medications on File Prior to Visit   Medication Sig Dispense Refill    buPROPion XL (Wellbutrin XL) 300 mg 24 hr tablet TAKE 1 TABLET BY MOUTH ONCE DAILY 90 tablet 3    traZODone (Desyrel) 50 mg tablet Take by mouth.      [DISCONTINUED] sertraline (Zoloft) 100 mg tablet TAKE 1 1/2 TABLETS BY MOUTH ONCE DAILY 135 tablet 3    ascorbic acid (Vitamin C) 1,000 mg tablet Take 1 tablet (1,000 mg) by mouth once daily.      aspirin 81 mg EC tablet Take 1 tablet (81 mg) by mouth once daily.      ASPIRIN-ACETAMINOPHEN-CAFFEINE ORAL Take by mouth if needed. for migraine      cyanocobalamin (Vitamin B-12) 1,000 mcg tablet Take 100 mcg by mouth once daily.       MINOXIDIL TOP Apply topically.      Restasis 0.05 % ophthalmic emulsion Administer into affected eye(s).       No current facility-administered medications on file prior to visit.       Lab Review:   not applicable      Time Spent:    Prep time: 1 min.  Direct patient time: 25 min.  Documentation time: 6 min.  Total time: 32 min.    Next Appointment:  Follow up in about 6 weeks (around 1/18/2024).

## 2023-12-09 ASSESSMENT — ENCOUNTER SYMPTOMS
MUSCULOSKELETAL NEGATIVE: 1
PSYCHIATRIC NEGATIVE: 1

## 2023-12-28 PROCEDURE — RXMED WILLOW AMBULATORY MEDICATION CHARGE

## 2024-01-05 PROCEDURE — RXMED WILLOW AMBULATORY MEDICATION CHARGE

## 2024-01-08 ENCOUNTER — PHARMACY VISIT (OUTPATIENT)
Dept: PHARMACY | Facility: CLINIC | Age: 49
End: 2024-01-08
Payer: COMMERCIAL

## 2024-01-18 ENCOUNTER — TELEMEDICINE (OUTPATIENT)
Dept: BEHAVIORAL HEALTH | Facility: CLINIC | Age: 49
End: 2024-01-18
Payer: COMMERCIAL

## 2024-01-18 DIAGNOSIS — F42.8 OBSESSIVE THINKING: ICD-10-CM

## 2024-01-18 DIAGNOSIS — R41.3 MEMORY IMPAIRMENT: ICD-10-CM

## 2024-01-18 DIAGNOSIS — F33.0 MILD EPISODE OF RECURRENT MAJOR DEPRESSIVE DISORDER (CMS-HCC): ICD-10-CM

## 2024-01-18 DIAGNOSIS — R41.840 ATTENTION DISTURBANCE: ICD-10-CM

## 2024-01-18 DIAGNOSIS — F41.9 ANXIETY: ICD-10-CM

## 2024-01-18 DIAGNOSIS — F33.8 SEASONAL DEPRESSION (CMS-HCC): ICD-10-CM

## 2024-01-18 PROBLEM — Z86.59 HISTORY OF DEPRESSION: Status: RESOLVED | Noted: 2024-01-18 | Resolved: 2024-01-18

## 2024-01-18 PROBLEM — Z86.59 HISTORY OF DEPRESSION: Status: ACTIVE | Noted: 2024-01-18

## 2024-01-18 PROBLEM — N63.0 MASS OF BREAST: Status: ACTIVE | Noted: 2023-05-17

## 2024-01-18 PROBLEM — E04.9 GOITER: Status: ACTIVE | Noted: 2022-06-23

## 2024-01-18 PROBLEM — L98.9 DISORDER OF SKIN: Status: ACTIVE | Noted: 2019-12-17

## 2024-01-18 PROCEDURE — 99214 OFFICE O/P EST MOD 30 MIN: CPT | Performed by: NURSE PRACTITIONER

## 2024-01-18 RX ORDER — NAPROXEN SODIUM 220 MG
220 TABLET ORAL EVERY 12 HOURS PRN
COMMUNITY
Start: 2017-02-20

## 2024-01-18 RX ORDER — ASPIRIN 81 MG/1
81 TABLET ORAL ONCE
COMMUNITY

## 2024-01-18 ASSESSMENT — PATIENT HEALTH QUESTIONNAIRE - PHQ9
8. MOVING OR SPEAKING SO SLOWLY THAT OTHER PEOPLE COULD HAVE NOTICED. OR THE OPPOSITE, BEING SO FIGETY OR RESTLESS THAT YOU HAVE BEEN MOVING AROUND A LOT MORE THAN USUAL: NOT AT ALL
5. POOR APPETITE OR OVEREATING: SEVERAL DAYS
1. LITTLE INTEREST OR PLEASURE IN DOING THINGS: SEVERAL DAYS
2. FEELING DOWN, DEPRESSED OR HOPELESS: SEVERAL DAYS
6. FEELING BAD ABOUT YOURSELF - OR THAT YOU ARE A FAILURE OR HAVE LET YOURSELF OR YOUR FAMILY DOWN: NOT AT ALL
7. TROUBLE CONCENTRATING ON THINGS, SUCH AS READING THE NEWSPAPER OR WATCHING TELEVISION: SEVERAL DAYS
9. THOUGHTS THAT YOU WOULD BE BETTER OFF DEAD, OR OF HURTING YOURSELF: NOT AT ALL
3. TROUBLE FALLING OR STAYING ASLEEP OR SLEEPING TOO MUCH: SEVERAL DAYS
4. FEELING TIRED OR HAVING LITTLE ENERGY: NEARLY EVERY DAY
10. IF YOU CHECKED OFF ANY PROBLEMS, HOW DIFFICULT HAVE THESE PROBLEMS MADE IT FOR YOU TO DO YOUR WORK, TAKE CARE OF THINGS AT HOME, OR GET ALONG WITH OTHER PEOPLE: SOMEWHAT DIFFICULT

## 2024-01-18 ASSESSMENT — ANXIETY QUESTIONNAIRES
1. FEELING NERVOUS, ANXIOUS, OR ON EDGE: NOT AT ALL
2. NOT BEING ABLE TO STOP OR CONTROL WORRYING: SEVERAL DAYS
3. WORRYING TOO MUCH ABOUT DIFFERENT THINGS: SEVERAL DAYS
7. FEELING AFRAID AS IF SOMETHING AWFUL MIGHT HAPPEN: NOT AT ALL
IF YOU CHECKED OFF ANY PROBLEMS ON THIS QUESTIONNAIRE, HOW DIFFICULT HAVE THESE PROBLEMS MADE IT FOR YOU TO DO YOUR WORK, TAKE CARE OF THINGS AT HOME, OR GET ALONG WITH OTHER PEOPLE: SOMEWHAT DIFFICULT
6. BECOMING EASILY ANNOYED OR IRRITABLE: SEVERAL DAYS
4. TROUBLE RELAXING: NOT AT ALL
GAD7 TOTAL SCORE: 3
5. BEING SO RESTLESS THAT IT IS HARD TO SIT STILL: NOT AT ALL

## 2024-01-18 ASSESSMENT — ENCOUNTER SYMPTOMS
GASTROINTESTINAL NEGATIVE: 1
MUSCULOSKELETAL NEGATIVE: 1
CARDIOVASCULAR NEGATIVE: 1
RESPIRATORY NEGATIVE: 1

## 2024-01-18 NOTE — PROGRESS NOTES
"Adult Ambulatory Psychiatry Progress Note      Assessment/Plan     Impression:  Suzy Bah is a 48 y.o. 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 I am still riding the wave of the Holidays. I feel with the increase in the Zoloft, I feel like the little things are not getting to me as much and not find myself focusing and focusing on the little details and getting caught up in them.\"    Plan: Reviewed and agreed to leaving medications and treatment plan in place as while her SAD is still there, unchanged even with increased dose of Zoloft, her other symptoms - obsessive thoughts, anxiety and underlying depression had improved. Encouraged to take Trazodone 1/2 tablet nightly if possible to improve sleep.     Medication: Zoloft 200mg every day. Trazodone 25-50mg PRN/HS, Wellbutrin XL 300mg every day.     Patient is reminded that if there is SI to call [phone] and get themselves to the closest ED for evaluation, otherwise contact me for other questions/concerns.     Diagnoses and all orders for this visit:  Attention disturbance  Memory impairment  Anxiety  Obsessive thinking  Seasonal depression (CMS/HCC)  Mild episode of recurrent major depressive disorder (CMS/HCC)        Therapy: none    Other: n/a    Patient is reminded that if there is SI to call [phone] and get themselves to the closest ED for evaluation, otherwise contact me for other questions/concerns.     Subjective   HPI:  \"Both the patient, and family and caregivers and guardians as appropriate, were informed of the current need to conduct treatment via telephone. I have confirmed the patient's identity via the following (minimum of three) acceptable identifiers as per  Policy PH-9: , SS, home address.\"     Present Illness - seasonal depression     Characteristics/Recent psychiatric symptoms (pertinent positives and negatives) - reports the SAD " still impacts her even with the increased Zoloft as 'this past weekend was so gray and dark out, that I had zero energy to get anything done. I just stayed inside and did nothing.' Feels her anxiety remains stable and not letting any ruminating thoughts of her past 'causing a loop to occur that will get me so anxious that I get stuck and upset about things now, that then causes me to get anxious.' Reports depression is 'ok but when the sun is out and or if it is just snow out, I am ok, but if it is just gray and dreary out, I am am blah.' Reports her sleep is 'a little better. I am not sleeping as so long on the weekends but I do still have issues  with getting up out of bed, because I am just too comfortable and it is a struggle for me to get out of bed.' Admits last week for several nights in a row, she was waking up 2-3x a night without cause, 'as I was wide awake, nothing was on my mind, and I wasn't anxious but it was bothering me. I didn't have to go to the bathroom. But I was at least able to go back to sleep in a few minutes. I did end up taking the Trazodone 1/2 tablet on Friday and Saturday night to help me sleep deeper on the weekend however, as those nights just left me feeling weird about my sleep.' Admits to also having 'I have my job and I have been here for 10 years and I like it but every morning I wake up and have this thought of I don't want to get out of bed because I am so over this.' Still makes effort to stick to a sleep routine and with reading a book, 'it helps ease my mind' and will fall asleep within 20-30 minutes. Weeknights gets 5-6 hrs and weekends will get 8-10 hrs. Admits to still not being a 'morning person' so her energy levels start out low but do level out through the day and like her mental/physical fatigue at the end of the day, will drop off. Appetite is 'good. I am not overeating. I have stopped gaining weight.' Reports her memory issues and attention concerns 'are the same.  No real change there. Nothing that has to be fixed right now, so I am ok with things for now.' Overall denies racing, obsessive or ruminating thoughts.      Onset/timeframe - 4 weeks  Type - seasonal depression  Duration - daily  Aggravating and/or relieving factors/triggers - changes in weather/season, and being colder out still impact her, even with Zoloft dose increase but feels her ruminating thoughts that can trigger her anxiety and underlying depression are better.   Related symptoms  Treatment and treatment changes (new meds, dosage increases or decreases, med compliance, therapy frequency, etc.) (Past and Recent) - Zoloft 200mg QD, Trazodone 25-50mg PRN, Wellbutrin XL 300mg QD. Still looking for therapist.     Issues: Denies SI/HI/AVH currently.          Review of Systems   Respiratory: Negative.     Cardiovascular: Negative.    Gastrointestinal: Negative.    Genitourinary: Negative.    Musculoskeletal: Negative.          Objective   Mental Status Exam:  General Appearance: Well groomed, appropriate eye contact  Attitude/Behavior: Cooperative  Motor: No psychomotor agitation or retardation, no tremor or other abnormal movements  Speech: Normal rate, volume, prosody  Gait/Station: Other:(comment) (sitting at work computer desk over virtual connection)  Mood: 'good, but that seasonal depression is still there'  Affect: Euthymic, full-range, Constricted, Congruent with mood and topic of conversation  Thought Process: Linear, goal directed  Thought Associations: No loosening of associations  Thought Content: Normal, Other: (comment) (seasonal depression still lingers but feeling better overall with Zoloft is a relief)  Perception: No perceptual abnormalities noted  Sensorium: Alert and oriented to person, place, time and situation  Insight: Intact  Judgement: Intact  Cognition: Cognitively intact to conversational testing with respect to attention, orientation, fund of knowledge, recent and remote memory, and  language  Testing: N/A    TRACI-7/PHQ-9 scores reviewed: 3, 8 compared to 3, 12 reflecting stable anxiety and improved depression.    Current Medications:  Current Outpatient Medications on File Prior to Visit   Medication Sig Dispense Refill    buPROPion XL (Wellbutrin XL) 300 mg 24 hr tablet TAKE 1 TABLET BY MOUTH ONCE DAILY 90 tablet 3    naproxen sodium (Aleve) 220 mg tablet Take 1 tablet (220 mg) by mouth every 12 hours if needed for mild pain (1 - 3).      sertraline (Zoloft) 100 mg tablet Take 2 tablets (200 mg) by mouth once daily. 240 tablet 0    traZODone (Desyrel) 50 mg tablet Take by mouth.      ascorbic acid (Vitamin C) 1,000 mg tablet Take 1 tablet (1,000 mg) by mouth once daily.      aspirin 81 mg EC tablet Take 1 tablet (81 mg) by mouth once daily.      aspirin 81 mg EC tablet Take 1 tablet (81 mg) by mouth 1 time.      ASPIRIN-ACETAMINOPHEN-CAFFEINE ORAL Take by mouth if needed. for migraine      cyanocobalamin (Vitamin B-12) 1,000 mcg tablet Take 100 mcg by mouth once daily.      docusate sodium (COLACE ORAL) Take 1 capsule by mouth if needed (constipation).      MINOXIDIL TOP Apply topically.      Restasis 0.05 % ophthalmic emulsion Administer into affected eye(s).       No current facility-administered medications on file prior to visit.       Lab Review:   not applicable      Time Spent:    Prep time: 1 min.  Direct patient time: 26 min.  Documentation time: 5 min.  Total time: 32 min.    Next Appointment:  Follow up in about 2 months (around 3/18/2024).

## 2024-02-08 ENCOUNTER — OFFICE VISIT (OUTPATIENT)
Dept: OBSTETRICS AND GYNECOLOGY | Facility: CLINIC | Age: 49
End: 2024-02-08
Payer: COMMERCIAL

## 2024-02-08 VITALS
DIASTOLIC BLOOD PRESSURE: 70 MMHG | WEIGHT: 171.4 LBS | HEIGHT: 68 IN | BODY MASS INDEX: 25.98 KG/M2 | SYSTOLIC BLOOD PRESSURE: 112 MMHG

## 2024-02-08 DIAGNOSIS — Z01.419 WOMEN'S ANNUAL ROUTINE GYNECOLOGICAL EXAMINATION: Primary | ICD-10-CM

## 2024-02-08 DIAGNOSIS — Z12.31 ENCOUNTER FOR SCREENING MAMMOGRAM FOR MALIGNANT NEOPLASM OF BREAST: ICD-10-CM

## 2024-02-08 PROCEDURE — 1036F TOBACCO NON-USER: CPT | Performed by: OBSTETRICS & GYNECOLOGY

## 2024-02-08 PROCEDURE — 99396 PREV VISIT EST AGE 40-64: CPT | Performed by: OBSTETRICS & GYNECOLOGY

## 2024-02-08 NOTE — PROGRESS NOTES
Pt presents for annual exam.  Pt states that periods are monthly, last 4-10 days, heavy for a day, light cramping.  Pt does not use birth control.  No other problems.   LMP 1/27/24  Last pap 2/3/23 Nml, HPV-  Mamm 4/29/22 Cat 2, benign  Tamikoshannan Nunez MA  Subjective   Patient ID: Suzy Bah is a 48 y.o. female who presents for Annual Exam.  HPI  Pt presents for annual exam.  Pt states that periods are monthly, last 4-10 days, not heavy or crampy.  Sometimes heavy at beginning, sometimes at the end.  Pt uses abstinence for birth control.  Pt states that she has been getting hot easier.  No other problems.     Review of Systems  all other symptoms were found to be neg except for HPI/CC.      Objective   Physical Exam  Chest:   Breasts:     Right: Normal.      Left: Normal.       Constitutional:       Appearance: Normal appearance.   HENT:      Head: Normocephalic and atraumatic.      Nose: Nose normal.   Cardiovascular:      Rate and Rhythm: Normal rate and regular rhythm.   Pulmonary:      Effort: Pulmonary effort is normal.   Abdominal:      Palpations: Abdomen is soft.   Genitourinary:     General: Normal vulva.      Exam position: Lithotomy position.      Akbar stage (genital): 5.      Labia:         Right: No rash, tenderness, lesion or injury.         Left: No rash, tenderness, lesion or injury.       Urethra: No prolapse, urethral pain, urethral swelling or urethral lesion.      Vagina: Normal.      Cervix: Normal.      Uterus: Normal.       Adnexa: Right adnexa normal and left adnexa normal.        Right: No mass, tenderness or fullness.          Left: No mass, tenderness or fullness.     Musculoskeletal:         General: Normal range of motion.      Cervical back: Normal range of motion.   Skin:     General: Skin is warm and dry.   Neurological:      General: No focal deficit present.      Mental Status: She is alert.   Psychiatric:         Mood and Affect: Mood normal.         Behavior: Behavior normal.       Assessment/Plan   Diagnoses and all orders for this visit:  Women's annual routine gynecological examination  Encounter for screening mammogram for malignant neoplasm of breast     Mamm ordered  Pt is to F/U in 1 yr for annual exam or PRN.  Pt is to call with any questions of concerns.   Talked about mauricio-menopause    Shameka Montemayor DO 02/08/24 9:41 AM

## 2024-03-21 ENCOUNTER — LAB REQUISITION (OUTPATIENT)
Dept: LAB | Facility: HOSPITAL | Age: 49
End: 2024-03-21

## 2024-03-21 ENCOUNTER — LAB REQUISITION (OUTPATIENT)
Dept: LAB | Facility: HOSPITAL | Age: 49
End: 2024-03-21
Payer: COMMERCIAL

## 2024-03-21 ENCOUNTER — APPOINTMENT (OUTPATIENT)
Dept: BEHAVIORAL HEALTH | Facility: CLINIC | Age: 49
End: 2024-03-21
Payer: COMMERCIAL

## 2024-03-21 DIAGNOSIS — J01.90 ACUTE SINUSITIS, UNSPECIFIED: ICD-10-CM

## 2024-03-21 DIAGNOSIS — J02.9 ACUTE PHARYNGITIS, UNSPECIFIED: ICD-10-CM

## 2024-03-21 LAB — SARS-COV-2 RNA RESP QL NAA+PROBE: NOT DETECTED

## 2024-03-21 PROCEDURE — 87635 SARS-COV-2 COVID-19 AMP PRB: CPT

## 2024-03-21 PROCEDURE — 87651 STREP A DNA AMP PROBE: CPT

## 2024-03-22 LAB — S PYO DNA THROAT QL NAA+PROBE: NOT DETECTED

## 2024-04-03 ENCOUNTER — TELEMEDICINE (OUTPATIENT)
Dept: BEHAVIORAL HEALTH | Facility: CLINIC | Age: 49
End: 2024-04-03
Payer: COMMERCIAL

## 2024-04-03 ENCOUNTER — PHARMACY VISIT (OUTPATIENT)
Dept: PHARMACY | Facility: CLINIC | Age: 49
End: 2024-04-03
Payer: COMMERCIAL

## 2024-04-03 DIAGNOSIS — G47.9 DIFFICULTY SLEEPING: ICD-10-CM

## 2024-04-03 DIAGNOSIS — F33.1 MODERATE EPISODE OF RECURRENT MAJOR DEPRESSIVE DISORDER (MULTI): Primary | ICD-10-CM

## 2024-04-03 DIAGNOSIS — F42.8 OBSESSIVE THINKING: ICD-10-CM

## 2024-04-03 DIAGNOSIS — F41.9 ANXIETY: ICD-10-CM

## 2024-04-03 PROCEDURE — RXMED WILLOW AMBULATORY MEDICATION CHARGE

## 2024-04-03 PROCEDURE — 99214 OFFICE O/P EST MOD 30 MIN: CPT | Performed by: NURSE PRACTITIONER

## 2024-04-03 PROCEDURE — 1036F TOBACCO NON-USER: CPT | Performed by: NURSE PRACTITIONER

## 2024-04-03 RX ORDER — BUPROPION HYDROCHLORIDE 300 MG/1
300 TABLET ORAL DAILY
Qty: 90 TABLET | Refills: 0 | Status: SHIPPED | OUTPATIENT
Start: 2024-04-03 | End: 2024-07-02

## 2024-04-03 RX ORDER — VIT C/E/ZN/COPPR/LUTEIN/ZEAXAN 250MG-90MG
25 CAPSULE ORAL 2 TIMES DAILY
COMMUNITY

## 2024-04-03 RX ORDER — BUPROPION HYDROCHLORIDE 150 MG/1
150 TABLET ORAL DAILY
Qty: 90 TABLET | Refills: 1 | Status: SHIPPED | OUTPATIENT
Start: 2024-04-03 | End: 2024-09-30

## 2024-04-03 ASSESSMENT — PATIENT HEALTH QUESTIONNAIRE - PHQ9
2. FEELING DOWN, DEPRESSED OR HOPELESS: NEARLY EVERY DAY
9. THOUGHTS THAT YOU WOULD BE BETTER OFF DEAD, OR OF HURTING YOURSELF: NOT AT ALL
3. TROUBLE FALLING OR STAYING ASLEEP OR SLEEPING TOO MUCH: NEARLY EVERY DAY
8. MOVING OR SPEAKING SO SLOWLY THAT OTHER PEOPLE COULD HAVE NOTICED. OR THE OPPOSITE, BEING SO FIGETY OR RESTLESS THAT YOU HAVE BEEN MOVING AROUND A LOT MORE THAN USUAL: NOT AT ALL
7. TROUBLE CONCENTRATING ON THINGS, SUCH AS READING THE NEWSPAPER OR WATCHING TELEVISION: SEVERAL DAYS
6. FEELING BAD ABOUT YOURSELF - OR THAT YOU ARE A FAILURE OR HAVE LET YOURSELF OR YOUR FAMILY DOWN: NOT AT ALL
5. POOR APPETITE OR OVEREATING: NEARLY EVERY DAY
4. FEELING TIRED OR HAVING LITTLE ENERGY: NEARLY EVERY DAY
1. LITTLE INTEREST OR PLEASURE IN DOING THINGS: NEARLY EVERY DAY
10. IF YOU CHECKED OFF ANY PROBLEMS, HOW DIFFICULT HAVE THESE PROBLEMS MADE IT FOR YOU TO DO YOUR WORK, TAKE CARE OF THINGS AT HOME, OR GET ALONG WITH OTHER PEOPLE: VERY DIFFICULT

## 2024-04-03 ASSESSMENT — ANXIETY QUESTIONNAIRES
1. FEELING NERVOUS, ANXIOUS, OR ON EDGE: NOT AT ALL
3. WORRYING TOO MUCH ABOUT DIFFERENT THINGS: SEVERAL DAYS
2. NOT BEING ABLE TO STOP OR CONTROL WORRYING: MORE THAN HALF THE DAYS
6. BECOMING EASILY ANNOYED OR IRRITABLE: MORE THAN HALF THE DAYS
4. TROUBLE RELAXING: NOT AT ALL
GAD7 TOTAL SCORE: 5
7. FEELING AFRAID AS IF SOMETHING AWFUL MIGHT HAPPEN: NOT AT ALL
IF YOU CHECKED OFF ANY PROBLEMS ON THIS QUESTIONNAIRE, HOW DIFFICULT HAVE THESE PROBLEMS MADE IT FOR YOU TO DO YOUR WORK, TAKE CARE OF THINGS AT HOME, OR GET ALONG WITH OTHER PEOPLE: NOT DIFFICULT AT ALL
5. BEING SO RESTLESS THAT IT IS HARD TO SIT STILL: NOT AT ALL

## 2024-04-03 ASSESSMENT — ENCOUNTER SYMPTOMS
RESPIRATORY NEGATIVE: 1
MUSCULOSKELETAL NEGATIVE: 1
CARDIOVASCULAR NEGATIVE: 1
GASTROINTESTINAL NEGATIVE: 1

## 2024-04-03 NOTE — PROGRESS NOTES
"Adult Ambulatory Psychiatry Progress Note      Assessment/Plan     Impression:  Suzy Bah is a 48 y.o. 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 I am doing good but because of the Easter holiday I usually I am in [place] with my parents and my sister and her family, but because I bought the condo and didn't have the funds, I am here so have felt a little more down as a result.\"    Plan: Reviewed and agreed to temporarily increasing Wellbutrin for depression but leave other medications and treatment plan in place. Again encouraged to take Trazodone 1/2 tablet nightly if possible to improve sleep and go to sleep more consistently every night.    Medication: Zoloft 200mg every day. Trazodone 25-50mg PRN/HS, increases Wellbutrin XL 300mg to 450mg every day.     Patient is reminded that if there is SI to call [phone] and get themselves to the closest ED for evaluation, otherwise contact me for other questions/concerns.     Diagnoses and all orders for this visit:  Moderate episode of recurrent major depressive disorder (CMS/HCC)  -     buPROPion XL (Wellbutrin XL) 150 mg 24 hr tablet; Take 1 tablet (150 mg) by mouth once daily. To be taken with current 300mg dose for a total of 450mg daily.  Anxiety  Obsessive thinking  Difficulty sleeping        Therapy: none    Other: n/a    Patient is reminded that if there is SI to call 988 and get themselves to the closest ED for evaluation, otherwise contact me for other questions/concerns.     Subjective   HPI:  \"Both the patient, and family and caregivers and guardians as appropriate, were informed of the current need to conduct treatment via telephone. I have confirmed the patient's identity via the " "following (minimum of three) acceptable identifiers as per  Policy PH-9: , SS, home address.\"     Present Illness - depression     Characteristics/Recent psychiatric symptoms (pertinent positives and negatives) - reports work stress is there, 'but it is just me staying busy, and everyone is getting along together and that feels good.' Reports having bought a condo, and buying furniture and while excited for that, has felt somewhat more depressed over the past 2-3 weeks d/t feeling overwhelmed with having so much on her plate - moving from her apartment that she has lived in for so long, and comfortable and starting a new chapter in her life (buying a condo), moving but also missing out on her family vacation to [place]. Reports noticing even the gray skies and rainy days can still impact her mood, 'as the past few days are harder for me to get out of bed. I just look outside and want to roll over in bed and go back to sleep, yet I work M-F and I don't have that kind of luxury.' Reports because of her feeling overwhelmed with the move, and depressed, 'and not necessarily anxiety', and having stayed up late most every night with packing boxes, in preparation for her move, she is going to bed later, more tired, but then sleeping in later, sleeping through 3 alarms and \"I have to force myself to get out of bed, and rushing myself to work.' Reports noticing her energy levels are depleted more lately as a result, and more mentally and physically fatigued through the day, often finding herself 'having to really push myself to stay awake otherwise I could easily just fall asleep and it is so hard to not want to put my face down and just go to sleep.' Reports only getting 5 hours on weeknights, and on weekends 8-11 hours and admits this is a problem of her own doing. Reports she will stay up to 11pm packing, then showers, and then will read a book to help her decompress and lull herself to sleep, and then wakes up with her " "alarm at 5/5:30am, and then oversleeping on weekends which cuts into her day to do things. Appetite is 'me always being hungry, but I am eating the wrong things. I am just not eating the food that would fuel me, and all it requires is me to do some fuel prep every day, and I did put on some weight and that is not good, and that then cycles into more not being happy with myself and how I feel.' Reports weight gain of 15-20 lbs over the past several years but \"I notice that I don't fit into my uniforms like I used to, and that is depressing.' Reports her memory issues and attention concerns 'are the same. Nothing worse. It is frustrating and it does bother me. It does not hinder me and I can get my work done.' Overall denies racing, obsessive or ruminating thoughts 'as I haven't really noticed those bothering me.'      Onset/timeframe - 4 weeks  Type - seasonal depression/seasonal  Duration - daily  Aggravating and/or relieving factors/triggers - changes in weather (cool/grey weather and rain) but feeling overwhelmed with moving (bought condo) and missing out on family vacation.  Related symptoms  Treatment and treatment changes (new meds, dosage increases or decreases, med compliance, therapy frequency, etc.) (Past and Recent) - Zoloft 200mg QD, Trazodone 25-50mg PRN, Wellbutrin XL 300mg QD. Still looking for therapist.     Issues: Denies SI/HI/AVH currently.          Review of Systems   Respiratory: Negative.     Cardiovascular: Negative.    Gastrointestinal: Negative.    Genitourinary: Negative.    Musculoskeletal: Negative.          Objective   Mental Status Exam:  General Appearance: Fairly groomed  Attitude/Behavior: Cooperative, Distracted, Fair eye contact  Motor: No psychomotor agitation or retardation, no tremor or other abnormal movements  Speech: Normal rate, volume, prosody  Gait/Station: Other:(comment) (sitting in living room, over virtual connection)  Mood: 'down, and overwhelmed'  Affect: Constricted, " Flat, Anxious, Congruent with mood and topic of conversation  Thought Process: Paucity of content, Linear, goal directed  Thought Associations: No loosening of associations  Thought Content: Normal, Other: (comment) (overwhelmed with packing up her apartment and middle of moving to condo she bought, and missing out on family vcation to [place] recently.)  Perception: No perceptual abnormalities noted  Sensorium: Alert and oriented to person, place, time and situation  Insight: Intact  Judgement: Intact  Cognition: Cognitively intact to conversational testing with respect to attention, orientation, fund of knowledge, recent and remote memory, and language  Testing: N/A    TRACI-7/PHQ-9 scores reviewed: 5, 16 compared to 3, 8 reflecting mild increase in anxiety and jump in depression.    Current Medications:  Current Outpatient Medications on File Prior to Visit   Medication Sig Dispense Refill    sertraline (Zoloft) 100 mg tablet Take 2 tablets (200 mg) by mouth once daily. 240 tablet 0    traZODone (Desyrel) 50 mg tablet Take by mouth.      [DISCONTINUED] buPROPion XL (Wellbutrin XL) 300 mg 24 hr tablet TAKE 1 TABLET BY MOUTH ONCE DAILY 90 tablet 3    aspirin 81 mg EC tablet Take 1 tablet (81 mg) by mouth 1 time.      ASPIRIN-ACETAMINOPHEN-CAFFEINE ORAL Take by mouth if needed. for migraine      cholecalciferol (Vitamin D3) 25 MCG (1000 UT) capsule Take 1 capsule (25 mcg) by mouth 2 times a day.      cyanocobalamin (Vitamin B-12) 1,000 mcg tablet Take 100 mcg by mouth once daily.      docusate sodium (COLACE ORAL) Take 1 capsule by mouth if needed (constipation).      MINOXIDIL TOP Apply topically.      naproxen sodium (Aleve) 220 mg tablet Take 1 tablet (220 mg) by mouth every 12 hours if needed for mild pain (1 - 3).      Restasis 0.05 % ophthalmic emulsion Administer into affected eye(s).      [DISCONTINUED] ascorbic acid (Vitamin C) 1,000 mg tablet Take 1 tablet (1,000 mg) by mouth once daily.      [DISCONTINUED]  aspirin 81 mg EC tablet Take 1 tablet (81 mg) by mouth once daily.       No current facility-administered medications on file prior to visit.       Lab Review:   not applicable      Time Spent:    Prep time: 1 min.  Direct patient time: 29 min.  Documentation time: 6 min.  Total time: 36 min.    Next Appointment:  Follow up in about 6 weeks (around 5/15/2024).

## 2024-05-12 DIAGNOSIS — F41.9 ANXIETY: ICD-10-CM

## 2024-05-12 DIAGNOSIS — F42.8 OBSESSIVE THINKING: ICD-10-CM

## 2024-05-12 DIAGNOSIS — F33.8 SEASONAL DEPRESSION (CMS-HCC): ICD-10-CM

## 2024-05-13 PROCEDURE — RXMED WILLOW AMBULATORY MEDICATION CHARGE

## 2024-05-13 RX ORDER — SERTRALINE HYDROCHLORIDE 100 MG/1
200 TABLET, FILM COATED ORAL DAILY
Qty: 240 TABLET | Refills: 0 | Status: SHIPPED | OUTPATIENT
Start: 2024-05-13 | End: 2024-09-10

## 2024-05-15 ENCOUNTER — PHARMACY VISIT (OUTPATIENT)
Dept: PHARMACY | Facility: CLINIC | Age: 49
End: 2024-05-15
Payer: COMMERCIAL

## 2024-05-15 ENCOUNTER — TELEMEDICINE (OUTPATIENT)
Dept: BEHAVIORAL HEALTH | Facility: CLINIC | Age: 49
End: 2024-05-15
Payer: COMMERCIAL

## 2024-05-15 DIAGNOSIS — G47.9 DIFFICULTY SLEEPING: ICD-10-CM

## 2024-05-15 DIAGNOSIS — F41.1 GAD (GENERALIZED ANXIETY DISORDER): Primary | ICD-10-CM

## 2024-05-15 DIAGNOSIS — F42.8 OBSESSIVE THINKING: ICD-10-CM

## 2024-05-15 PROCEDURE — 99214 OFFICE O/P EST MOD 30 MIN: CPT | Performed by: NURSE PRACTITIONER

## 2024-05-15 ASSESSMENT — PATIENT HEALTH QUESTIONNAIRE - PHQ9
4. FEELING TIRED OR HAVING LITTLE ENERGY: NEARLY EVERY DAY
3. TROUBLE FALLING OR STAYING ASLEEP OR SLEEPING TOO MUCH: SEVERAL DAYS
5. POOR APPETITE OR OVEREATING: NOT AT ALL
8. MOVING OR SPEAKING SO SLOWLY THAT OTHER PEOPLE COULD HAVE NOTICED. OR THE OPPOSITE, BEING SO FIGETY OR RESTLESS THAT YOU HAVE BEEN MOVING AROUND A LOT MORE THAN USUAL: NOT AT ALL
1. LITTLE INTEREST OR PLEASURE IN DOING THINGS: MORE THAN HALF THE DAYS
6. FEELING BAD ABOUT YOURSELF - OR THAT YOU ARE A FAILURE OR HAVE LET YOURSELF OR YOUR FAMILY DOWN: NOT AT ALL
7. TROUBLE CONCENTRATING ON THINGS, SUCH AS READING THE NEWSPAPER OR WATCHING TELEVISION: SEVERAL DAYS
9. THOUGHTS THAT YOU WOULD BE BETTER OFF DEAD, OR OF HURTING YOURSELF: NOT AT ALL
10. IF YOU CHECKED OFF ANY PROBLEMS, HOW DIFFICULT HAVE THESE PROBLEMS MADE IT FOR YOU TO DO YOUR WORK, TAKE CARE OF THINGS AT HOME, OR GET ALONG WITH OTHER PEOPLE: SOMEWHAT DIFFICULT
2. FEELING DOWN, DEPRESSED OR HOPELESS: MORE THAN HALF THE DAYS

## 2024-05-15 ASSESSMENT — ANXIETY QUESTIONNAIRES
6. BECOMING EASILY ANNOYED OR IRRITABLE: SEVERAL DAYS
3. WORRYING TOO MUCH ABOUT DIFFERENT THINGS: SEVERAL DAYS
GAD7 TOTAL SCORE: 6
7. FEELING AFRAID AS IF SOMETHING AWFUL MIGHT HAPPEN: NOT AT ALL
IF YOU CHECKED OFF ANY PROBLEMS ON THIS QUESTIONNAIRE, HOW DIFFICULT HAVE THESE PROBLEMS MADE IT FOR YOU TO DO YOUR WORK, TAKE CARE OF THINGS AT HOME, OR GET ALONG WITH OTHER PEOPLE: SOMEWHAT DIFFICULT
4. TROUBLE RELAXING: MORE THAN HALF THE DAYS
1. FEELING NERVOUS, ANXIOUS, OR ON EDGE: SEVERAL DAYS
5. BEING SO RESTLESS THAT IT IS HARD TO SIT STILL: NOT AT ALL
2. NOT BEING ABLE TO STOP OR CONTROL WORRYING: SEVERAL DAYS

## 2024-05-15 NOTE — PROGRESS NOTES
"Adult Ambulatory Psychiatry Progress Note      Assessment/Plan     Impression:  Suzy Bah is a 48 y.o. 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 I noticed a positive difference with going up on the dose of the Wellbutrin.\"    Plan: Patient was calm and engaging. Reports increased dose of Wellbutrin has helped with her overall mood. At work stress with a coworker's negativity being expressed on-site has left everyone wanting to avoid her, but otherwise overall feels mood is stable. Reviewed and agreed to leaving medications and treatment plan in place. And again encouraged to take Trazodone 1/2 tablet nightly if possible to improve sleep and go to sleep more consistently every night.    Medication: Zoloft 200mg every day. Trazodone 25-50mg PRN/HS, Wellbutrin XL 450mg every day.     Patient is reminded that if there is SI to call [phone] and get themselves to the closest ED for evaluation, otherwise contact me for other questions/concerns.     Diagnoses and all orders for this visit:  TRACI (generalized anxiety disorder)  Obsessive thinking  Difficulty sleeping    Therapy: none    Other: n/a    Patient is reminded that if there is SI to call 988 and get themselves to the closest ED for evaluation, otherwise contact me for other questions/concerns.     Subjective   HPI:  \"Both the patient, and family and caregivers and guardians as appropriate, were informed of the current need to conduct treatment via telephone. I have confirmed the patient's identity via the following (minimum of three) acceptable identifiers as per  Policy PH-9: , SS, home address.\"     Virtual or Telephone Consent    An interactive audio and video telecommunication system which permits real time communications between the patient " (at the originating site) and provider (at the distant site) was utilized to provide this telehealth service.   Verbal consent was requested and obtained from Suzy Bah on this date, 05/18/24 for a telehealth visit.     Present Illness - depression     Characteristics/Recent psychiatric symptoms (pertinent positives and negatives) - reports work stress is there, 'but I do have a coworker who is not happy with her home life that is spilling over into the office and the whole office is feeling it and we all have talked with the boss and they are trying to find a way to work through this, so now I feel like I am being heard about the situation and not just being ignored.' Reports only sadness is in the transition from her apartment that she loved to moving into the condo that she had bought and acknowledges that it is due to being in a place of comfort but also knowing that she is having to push herself through a significant change in her life, but something that she is doing for herself, and knows this is for the better. Reports can still notice that when the gray skies and rainy days are occurring and have a negative impact on her mood, 'I am however still able to get things done instead of shutting down, but I definitely notice how much happier I feel when the sun is out and it is warmer.' Reports this past weekend let herself sleep in to make up for lack of sleep on the weeknights, and still is staying up late with packing but often can find herself enjoying a good book that 'last night I read for 4 hours when I know I should have cut myself off after 2. But then I have also found myself often getting on my Ipad and looked at shopping for things for my condo and 5 hours will slip on by and I will shock myself when I lose track of time. Like when something really catches my attention, I will lose time. I really should make the effort to stop or use an alarm to remind myself to stop what I am doing, so I do go  to sleep earlier.' Still endorses her energy levels are depleted more lately as a result, and more mentally and physically fatigued through the day, and knows this is tied to her lack of proper sleep. Only gets 5 hours on week nights and 8-11 hours on weekends. Appetite is 'pretty normal, even though I am not making the best dietary as I am just too tired to cook something, like last night I had some cereal.' Denies weight loss with increased dose in Wellbutrin. Memory issues/brain fog are 'about the same if not a little bit better, maybe.' Overall has not noticed any issues related to racing, obsessive or ruminating thoughts.      Onset/timeframe - 4 weeks  Type - seasonal depression/seasonal  Duration - daily  Aggravating and/or relieving factors/triggers - changes in weather (cool/grey weather and rain) but feeling overwhelmed with moving (bought condo) and missing out on family vacation.  Related symptoms  Treatment and treatment changes (new meds, dosage increases or decreases, med compliance, therapy frequency, etc.) (Past and Recent) - Zoloft 200mg QD, Trazodone 25-50mg PRN, Wellbutrin XL 300mg QD. Still looking for therapist.     Issues: Denies SI/HI/AVH currently.      Review of Systems   Constitutional: Negative.    HENT: Negative.     Psychiatric/Behavioral: Negative.         Objective   Mental Status Exam:  General Appearance: Well groomed, appropriate eye contact  Attitude/Behavior: Cooperative  Motor: No psychomotor agitation or retardation, no tremor or other abnormal movements  Speech: Normal rate, volume, prosody  Gait/Station: Other:(comment) (sitting in living room, over virtual connection)  Mood: 'doing ok'  Affect: Euthymic, full-range  Thought Process: Linear, goal directed  Thought Associations: No loosening of associations  Thought Content: Normal  Perception: No perceptual abnormalities noted  Sensorium: Alert and oriented to person, place, time and situation  Insight: Intact  Judgement:  Intact  Cognition: Cognitively intact to conversational testing with respect to attention, orientation, fund of knowledge, recent and remote memory, and language  Testing: N/A    TRACI-7/PHQ-9 scores reviewed: 6, 9 compared to 5, 16 reflecting mild increase in anxiety but a drop in depression.    Current Medications:  Current Outpatient Medications on File Prior to Visit   Medication Sig Dispense Refill    aspirin 81 mg EC tablet Take 1 tablet (81 mg) by mouth 1 time.      ASPIRIN-ACETAMINOPHEN-CAFFEINE ORAL Take by mouth if needed. for migraine      buPROPion XL (Wellbutrin XL) 150 mg 24 hr tablet Take 1 tablet (150 mg) by mouth once daily. To be taken with current 300mg dose for a total of 450mg daily. 90 tablet 1    buPROPion XL (Wellbutrin XL) 300 mg 24 hr tablet TAKE 1 TABLET BY MOUTH ONCE DAILY 90 tablet 0    cholecalciferol (Vitamin D3) 25 MCG (1000 UT) capsule Take 1 capsule (25 mcg) by mouth 2 times a day.      cyanocobalamin (Vitamin B-12) 1,000 mcg tablet Take 100 mcg by mouth once daily.      docusate sodium (COLACE ORAL) Take 1 capsule by mouth if needed (constipation).      MINOXIDIL TOP Apply topically.      naproxen sodium (Aleve) 220 mg tablet Take 1 tablet (220 mg) by mouth every 12 hours if needed for mild pain (1 - 3).      Restasis 0.05 % ophthalmic emulsion Administer into affected eye(s).      sertraline (Zoloft) 100 mg tablet Take 2 tablets (200 mg) by mouth once daily. 240 tablet 0    traZODone (Desyrel) 50 mg tablet Take by mouth.      [DISCONTINUED] sertraline (Zoloft) 100 mg tablet Take 2 tablets (200 mg) by mouth once daily. 240 tablet 0     No current facility-administered medications on file prior to visit.       Lab Review:   not applicable      Time Spent:    Prep time: 1 min.  Direct patient time: 27 min.  Documentation time: 6 min.  Total time: 34 min.    Next Appointment:  Follow up in about 2 months (around 7/15/2024).

## 2024-05-18 PROBLEM — F41.9 ANXIETY: Status: RESOLVED | Noted: 2023-05-17 | Resolved: 2024-05-18

## 2024-05-18 PROBLEM — F41.1 GAD (GENERALIZED ANXIETY DISORDER): Status: ACTIVE | Noted: 2024-05-18

## 2024-05-18 ASSESSMENT — ENCOUNTER SYMPTOMS
CONSTITUTIONAL NEGATIVE: 1
PSYCHIATRIC NEGATIVE: 1

## 2024-06-05 ENCOUNTER — LAB (OUTPATIENT)
Dept: LAB | Facility: LAB | Age: 49
End: 2024-06-05
Payer: COMMERCIAL

## 2024-06-05 DIAGNOSIS — E05.90 HYPERTHYROIDISM: ICD-10-CM

## 2024-06-05 DIAGNOSIS — E55.9 VITAMIN D DEFICIENCY: ICD-10-CM

## 2024-06-05 LAB
25(OH)D3 SERPL-MCNC: 64 NG/ML (ref 30–100)
T3 SERPL-MCNC: 94 NG/DL (ref 60–200)
T4 FREE SERPL-MCNC: 0.69 NG/DL (ref 0.61–1.12)
TSH SERPL-ACNC: 0.68 MIU/L (ref 0.44–3.98)

## 2024-06-05 PROCEDURE — 36415 COLL VENOUS BLD VENIPUNCTURE: CPT

## 2024-06-05 PROCEDURE — 84443 ASSAY THYROID STIM HORMONE: CPT

## 2024-06-05 PROCEDURE — 84439 ASSAY OF FREE THYROXINE: CPT

## 2024-06-05 PROCEDURE — 84445 ASSAY OF TSI GLOBULIN: CPT

## 2024-06-05 PROCEDURE — 84480 ASSAY TRIIODOTHYRONINE (T3): CPT

## 2024-06-05 PROCEDURE — 82306 VITAMIN D 25 HYDROXY: CPT

## 2024-06-06 ENCOUNTER — OFFICE VISIT (OUTPATIENT)
Dept: ENDOCRINOLOGY | Facility: CLINIC | Age: 49
End: 2024-06-06
Payer: COMMERCIAL

## 2024-06-06 VITALS
DIASTOLIC BLOOD PRESSURE: 73 MMHG | HEIGHT: 68 IN | SYSTOLIC BLOOD PRESSURE: 115 MMHG | BODY MASS INDEX: 26.22 KG/M2 | WEIGHT: 173 LBS

## 2024-06-06 DIAGNOSIS — E04.1 THYROID NODULE: Primary | ICD-10-CM

## 2024-06-06 DIAGNOSIS — R79.89 ABNORMAL TSH: ICD-10-CM

## 2024-06-06 RX ORDER — PROPRANOLOL HYDROCHLORIDE 10 MG/1
10 TABLET ORAL 2 TIMES DAILY
Qty: 60 TABLET | Refills: 2 | Status: SHIPPED | OUTPATIENT
Start: 2024-06-06 | End: 2024-09-04

## 2024-06-06 ASSESSMENT — ENCOUNTER SYMPTOMS
FREQUENCY: 0
LIGHT-HEADEDNESS: 0
NAUSEA: 0
ARTHRALGIAS: 0
PHOTOPHOBIA: 0
SORE THROAT: 0
SHORTNESS OF BREATH: 0
HEADACHES: 0
SLEEP DISTURBANCE: 0
CONSTIPATION: 0
DYSURIA: 0
AGITATION: 0
ABDOMINAL DISTENTION: 0
TROUBLE SWALLOWING: 0
VOICE CHANGE: 0
NERVOUS/ANXIOUS: 0
ABDOMINAL PAIN: 0
TREMORS: 0
PALPITATIONS: 0
VOMITING: 0
DIARRHEA: 0
CONSTITUTIONAL NEGATIVE: 1
CHEST TIGHTNESS: 0
EYE ITCHING: 0

## 2024-06-06 NOTE — PROGRESS NOTES
"Subjective   Patient ID: Suzy Bah is a 49 y.o. female who presents for Hyperthyroidism (PCP: Gross/Dx toxic adenoma 2023 /[facility] thyroid: none known).  Lab Results   Component Value Date    TSH 0.68 06/05/2024      HPI   See AP     Review of Systems   Constitutional: Negative.    HENT:  Negative for sore throat, trouble swallowing and voice change.    Eyes:  Negative for photophobia, itching and visual disturbance.   Respiratory:  Negative for chest tightness and shortness of breath.    Cardiovascular:  Negative for chest pain and palpitations.   Gastrointestinal:  Negative for abdominal distention, abdominal pain, constipation, diarrhea, nausea and vomiting.   Endocrine: Negative for cold intolerance, heat intolerance and polyuria.   Genitourinary:  Negative for dysuria and frequency.   Musculoskeletal:  Negative for arthralgias.   Skin:  Negative for pallor.   Allergic/Immunologic: Negative for environmental allergies.   Neurological:  Negative for tremors, light-headedness and headaches.   Psychiatric/Behavioral:  Negative for agitation and sleep disturbance. The patient is not nervous/anxious.        Objective   Physical Exam  Constitutional:       Appearance: Normal appearance.   HENT:      Head: Normocephalic.      Nose: Nose normal.      Mouth/Throat:      Mouth: Mucous membranes are moist.   Eyes:      Extraocular Movements: Extraocular movements intact.   Cardiovascular:      Rate and Rhythm: Normal rate.   Pulmonary:      Effort: Pulmonary effort is normal. No respiratory distress.   Abdominal:      General: There is no distension.   Musculoskeletal:         General: Normal range of motion.      Cervical back: Normal range of motion and neck supple.   Skin:     General: Skin is warm and dry.   Neurological:      Mental Status: She is alert and oriented to person, place, and time.   Psychiatric:         Mood and Affect: Mood normal.      Visit Vitals  /73   Ht 1.727 m (5' 8\")   Wt 78.5 kg (173 lb) "   BMI 26.30 kg/m²   Smoking Status Never   BSA 1.94 m²        Assessment/Plan   Diagnoses and all orders for this visit:  Thyroid nodule  -     propranolol (Inderal) 10 mg tablet; Take 1 tablet (10 mg) by mouth 2 times a day.  -     TSH; Future  -     T4, free; Future  -     T3; Future  -     Thyroid stimulating immunoglobulin; Future  Abnormal TSH              50 yo woman came to [facility] for toxic adenoma.       She mentions she initially started having symptoms of hyperthyroidism early 2022- fatigue weight loss, palpitations.   Jan 2022 TSH 0.27 nl free T4 and T3   US thyroid 2/ 2022- [facility] with dominant left thyroid nodule ~ 2.9 cm   FNA biopsy left nodule- 3/ 2022- benign follicular nodule.   She saw Dr. Gutierrez 4/ 2022  UPTAKE scan 6 / 2022 - anterior neck uptake 27 %   Increased uptake mid pole left thyroid - likely hot nodule ( the one that was biopsied)    TSI ab neg   She mentions she was clinically observed , no BB or MMI or ROWE      Currently she mentions she fels well, palpitations once a week , it was daily in past   She mentions energy is fair. She has regular menstruations. Sleep - okay, no cramps .       10/ 2023- US thyroid -[facility] : R- 0.9 cm TR 3- not changed , left mid pole 1.4 cm TR 2 , no sig change, L inf lobe ~ 2.6 cm no sig change TR 4   11/28/23- TSH 0.97 free T4 0.58 T3- 3.2   6/5/2024 TSH 0.68 , free T4 was 0.69, T3 was 94    No biotin use now. Stopped in past       Denies any eye symptoms   Clinically euthyroid       PLAN :   - discussed continued clinical and biochemical follow up   - advised continuing HR monitoring when she has symptoms, given propranolol 10 mg twice a day as needed  - discussed possible ROWE in case of symptoms of hyperthyroidism   - US thyroid 10/ 2024.   - discussed NH of toxic adenoma.      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

## 2024-06-11 LAB — TSI SER-ACNC: <1 TSI INDEX

## 2024-06-26 DIAGNOSIS — F33.1 MODERATE EPISODE OF RECURRENT MAJOR DEPRESSIVE DISORDER (MULTI): ICD-10-CM

## 2024-06-26 PROCEDURE — RXMED WILLOW AMBULATORY MEDICATION CHARGE

## 2024-06-26 RX ORDER — BUPROPION HYDROCHLORIDE 300 MG/1
300 TABLET ORAL DAILY
Qty: 90 TABLET | Refills: 0 | Status: SHIPPED | OUTPATIENT
Start: 2024-06-26 | End: 2024-09-25

## 2024-06-27 ENCOUNTER — PHARMACY VISIT (OUTPATIENT)
Dept: PHARMACY | Facility: CLINIC | Age: 49
End: 2024-06-27
Payer: COMMERCIAL

## 2024-06-27 PROCEDURE — RXMED WILLOW AMBULATORY MEDICATION CHARGE

## 2024-07-11 PROCEDURE — 88305 TISSUE EXAM BY PATHOLOGIST: CPT

## 2024-07-16 ENCOUNTER — LAB REQUISITION (OUTPATIENT)
Dept: LAB | Facility: HOSPITAL | Age: 49
End: 2024-07-16
Payer: COMMERCIAL

## 2024-07-17 ENCOUNTER — APPOINTMENT (OUTPATIENT)
Dept: BEHAVIORAL HEALTH | Facility: CLINIC | Age: 49
End: 2024-07-17
Payer: COMMERCIAL

## 2024-07-19 LAB
LABORATORY COMMENT REPORT: NORMAL
PATH REPORT.FINAL DX SPEC: NORMAL
PATH REPORT.GROSS SPEC: NORMAL
PATH REPORT.RELEVANT HX SPEC: NORMAL
PATH REPORT.TOTAL CANCER: NORMAL

## 2024-07-29 ENCOUNTER — PATIENT MESSAGE (OUTPATIENT)
Dept: CARE COORDINATION | Facility: CLINIC | Age: 49
End: 2024-07-29

## 2024-08-12 PROCEDURE — RXMED WILLOW AMBULATORY MEDICATION CHARGE

## 2024-08-13 ENCOUNTER — PHARMACY VISIT (OUTPATIENT)
Dept: PHARMACY | Facility: CLINIC | Age: 49
End: 2024-08-13
Payer: COMMERCIAL

## 2024-09-11 ENCOUNTER — PHARMACY VISIT (OUTPATIENT)
Dept: PHARMACY | Facility: CLINIC | Age: 49
End: 2024-09-11
Payer: COMMERCIAL

## 2024-09-11 DIAGNOSIS — F33.8 SEASONAL DEPRESSION (CMS-HCC): ICD-10-CM

## 2024-09-11 DIAGNOSIS — F33.1 MODERATE EPISODE OF RECURRENT MAJOR DEPRESSIVE DISORDER (MULTI): ICD-10-CM

## 2024-09-11 DIAGNOSIS — F42.8 OBSESSIVE THINKING: ICD-10-CM

## 2024-09-11 DIAGNOSIS — F41.9 ANXIETY: ICD-10-CM

## 2024-09-11 PROCEDURE — RXMED WILLOW AMBULATORY MEDICATION CHARGE

## 2024-09-11 RX ORDER — BUPROPION HYDROCHLORIDE 150 MG/1
150 TABLET ORAL DAILY
Qty: 90 TABLET | Refills: 0 | Status: SHIPPED | OUTPATIENT
Start: 2024-09-11 | End: 2024-12-12

## 2024-09-11 RX ORDER — SERTRALINE HYDROCHLORIDE 100 MG/1
200 TABLET, FILM COATED ORAL DAILY
Qty: 240 TABLET | Refills: 0 | Status: SHIPPED | OUTPATIENT
Start: 2024-09-11 | End: 2025-01-09

## 2024-09-11 RX ORDER — BUPROPION HYDROCHLORIDE 300 MG/1
300 TABLET ORAL DAILY
Qty: 90 TABLET | Refills: 0 | Status: SHIPPED | OUTPATIENT
Start: 2024-09-11 | End: 2024-12-12

## 2024-09-13 ENCOUNTER — PHARMACY VISIT (OUTPATIENT)
Dept: PHARMACY | Facility: CLINIC | Age: 49
End: 2024-09-13
Payer: COMMERCIAL

## 2024-09-13 NOTE — PROGRESS NOTES
Made a call to this patient using the phone number provided on file. No answer. Left a message to return call at .    
show

## 2024-11-06 ENCOUNTER — HOSPITAL ENCOUNTER (OUTPATIENT)
Dept: RADIOLOGY | Facility: HOSPITAL | Age: 49
Discharge: HOME | End: 2024-11-06
Payer: COMMERCIAL

## 2024-11-06 DIAGNOSIS — E04.1 THYROID NODULE: ICD-10-CM

## 2024-11-06 PROCEDURE — 76536 US EXAM OF HEAD AND NECK: CPT | Performed by: RADIOLOGY

## 2024-11-06 PROCEDURE — 76536 US EXAM OF HEAD AND NECK: CPT

## 2024-12-04 PROCEDURE — RXMED WILLOW AMBULATORY MEDICATION CHARGE

## 2024-12-05 ENCOUNTER — APPOINTMENT (OUTPATIENT)
Dept: ENDOCRINOLOGY | Facility: CLINIC | Age: 49
End: 2024-12-05
Payer: COMMERCIAL

## 2024-12-05 VITALS
BODY MASS INDEX: 25.61 KG/M2 | DIASTOLIC BLOOD PRESSURE: 81 MMHG | WEIGHT: 169 LBS | HEIGHT: 68 IN | SYSTOLIC BLOOD PRESSURE: 116 MMHG

## 2024-12-05 DIAGNOSIS — R79.89 ABNORMAL TSH: Primary | ICD-10-CM

## 2024-12-05 DIAGNOSIS — E04.9 ENLARGED THYROID GLAND: ICD-10-CM

## 2024-12-05 PROCEDURE — 99214 OFFICE O/P EST MOD 30 MIN: CPT | Performed by: HOSPITALIST

## 2024-12-05 PROCEDURE — 3008F BODY MASS INDEX DOCD: CPT | Performed by: HOSPITALIST

## 2024-12-05 ASSESSMENT — ENCOUNTER SYMPTOMS
VOMITING: 0
DIARRHEA: 0
TREMORS: 0
AGITATION: 0
FREQUENCY: 0
PHOTOPHOBIA: 0
SORE THROAT: 0
TROUBLE SWALLOWING: 0
DYSURIA: 0
EYE ITCHING: 0
CONSTITUTIONAL NEGATIVE: 1
SHORTNESS OF BREATH: 0
ABDOMINAL PAIN: 0
PALPITATIONS: 1
ABDOMINAL DISTENTION: 0
CHEST TIGHTNESS: 0
SLEEP DISTURBANCE: 0
HEADACHES: 0
ARTHRALGIAS: 0
NERVOUS/ANXIOUS: 0
CONSTIPATION: 0
NAUSEA: 0
LIGHT-HEADEDNESS: 0
VOICE CHANGE: 0

## 2024-12-05 NOTE — PROGRESS NOTES
"Subjective   Patient ID: Suzy Bah is a 49 y.o. female who presents for Hyperthyroidism (PCP: Gross/Dx toxic adenoma 2023 /[facility] thyroid: none known).  Lab Results   Component Value Date    TSH 0.68 06/05/2024      HPI    See AP     Review of Systems   Constitutional: Negative.    HENT:  Negative for sore throat, trouble swallowing and voice change.    Eyes:  Negative for photophobia, itching and visual disturbance.   Respiratory:  Negative for chest tightness and shortness of breath.    Cardiovascular:  Positive for palpitations. Negative for chest pain.   Gastrointestinal:  Negative for abdominal distention, abdominal pain, constipation, diarrhea, nausea and vomiting.   Endocrine: Negative for cold intolerance, heat intolerance and polyuria.   Genitourinary:  Negative for dysuria and frequency.   Musculoskeletal:  Negative for arthralgias.   Skin:  Negative for pallor.   Allergic/Immunologic: Negative for environmental allergies.   Neurological:  Negative for tremors, light-headedness and headaches.   Psychiatric/Behavioral:  Negative for agitation and sleep disturbance. The patient is not nervous/anxious.        Objective   Physical Exam  Constitutional:       Appearance: Normal appearance.   HENT:      Head: Normocephalic.      Nose: Nose normal.      Mouth/Throat:      Mouth: Mucous membranes are moist.   Eyes:      Extraocular Movements: Extraocular movements intact.   Cardiovascular:      Rate and Rhythm: Normal rate.   Pulmonary:      Effort: Pulmonary effort is normal. No respiratory distress.   Abdominal:      General: There is no distension.   Musculoskeletal:         General: Normal range of motion.      Cervical back: Normal range of motion and neck supple.   Skin:     General: Skin is warm and dry.   Neurological:      Mental Status: She is alert and oriented to person, place, and time.   Psychiatric:         Mood and Affect: Mood normal.    Visit Vitals  /81   Ht 1.727 m (5' 8\")   Wt 76.7 kg " (169 lb)   BMI 25.70 kg/m²   Smoking Status Never   BSA 1.92 m²        Assessment/Plan   Diagnoses and all orders for this visit:  Abnormal TSH  Enlarged thyroid gland              48 yo woman came to [facility] care for toxic adenoma.       She mentions she initially started having symptoms of hyperthyroidism early 2022- fatigue weight loss, palpitations.   Jan 2022 TSH 0.27 nl free T4 and T3   US thyroid 2/ 2022- [facility] with dominant left thyroid nodule ~ 2.9 cm   FNA biopsy left nodule- 3/ 2022- benign follicular nodule.   She saw Dr. Gutierrez 4/ 2022  UPTAKE scan 6 / 2022 - anterior neck uptake 27 %   Increased uptake mid pole left thyroid - likely hot nodule ( the one that was biopsied)    TSI ab neg   She mentions she was clinically observed , no BB or MMI or ROWE      Currently she mentions she fels well, she complains of palpitations 1-3 times a week  It was daily in past   She mentions energy is fair. She has regular menstruations. Sleep - okay, no cramps .       10/ 2023- US thyroid -[facility] : R- 0.9 cm TR 3- not changed , left mid pole 1.4 cm TR 2 , no sig change, L inf lobe ~ 2.6 cm no sig change TR 4   11/28/23- TSH 0.97 free T4 0.58 T3- 3.2   6/5/2024 TSH 0.68 , free T4 was 0.69, T3 was 94    No biotin use now. Stopped in past        Ultrasound thyroid 11/2024 right lobe hypoechoic solid nodule 8 mm  Left lobe 16 mm TR 5 solid nodule with calcification, another solid nodule 24 mm TR 5 smaller than previous ultrasound (midpole left lobe increased uptake and nuclear medicine scan in past, hot nodule)     Denies any eye symptoms   Clinically euthyroid       PLAN :   -Repeat ultrasound again in November 2025  -She has not started propranolol advised to try propranolol 10 mg daily or twice as needed given she does have occasional palpitations  -Repeat blood work TFT.  Today    - discussed continued clinical and biochemical follow up   - advised continuing HR monitoring   - discussed possible ROWE in case of symptoms of  hyperthyroidism  - discussed NH of toxic adenoma.      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

## 2024-12-09 PROCEDURE — RXMED WILLOW AMBULATORY MEDICATION CHARGE

## 2024-12-10 ENCOUNTER — PHARMACY VISIT (OUTPATIENT)
Dept: PHARMACY | Facility: CLINIC | Age: 49
End: 2024-12-10
Payer: COMMERCIAL

## 2025-01-03 ENCOUNTER — LAB (OUTPATIENT)
Dept: LAB | Facility: LAB | Age: 50
End: 2025-01-03
Payer: COMMERCIAL

## 2025-01-03 DIAGNOSIS — E04.1 THYROID NODULE: ICD-10-CM

## 2025-01-03 LAB
T4 FREE SERPL-MCNC: 0.77 NG/DL (ref 0.61–1.12)
TSH SERPL-ACNC: 0.76 MIU/L (ref 0.44–3.98)

## 2025-01-03 PROCEDURE — 84443 ASSAY THYROID STIM HORMONE: CPT

## 2025-01-03 PROCEDURE — 36415 COLL VENOUS BLD VENIPUNCTURE: CPT

## 2025-01-03 PROCEDURE — 84439 ASSAY OF FREE THYROXINE: CPT

## 2025-01-03 PROCEDURE — 84445 ASSAY OF TSI GLOBULIN: CPT

## 2025-01-03 PROCEDURE — 84480 ASSAY TRIIODOTHYRONINE (T3): CPT

## 2025-01-04 LAB — T3 SERPL-MCNC: 118 NG/DL (ref 60–200)

## 2025-01-07 DIAGNOSIS — F41.9 ANXIETY: ICD-10-CM

## 2025-01-07 DIAGNOSIS — F33.1 MODERATE EPISODE OF RECURRENT MAJOR DEPRESSIVE DISORDER: ICD-10-CM

## 2025-01-07 DIAGNOSIS — F42.8 OBSESSIVE THINKING: ICD-10-CM

## 2025-01-07 DIAGNOSIS — F33.8 SEASONAL DEPRESSION (CMS-HCC): ICD-10-CM

## 2025-01-07 RX ORDER — BUPROPION HYDROCHLORIDE 150 MG/1
150 TABLET ORAL DAILY
Qty: 90 TABLET | Refills: 0 | Status: CANCELLED | OUTPATIENT
Start: 2025-01-07 | End: 2025-04-07

## 2025-01-08 DIAGNOSIS — F33.1 MODERATE EPISODE OF RECURRENT MAJOR DEPRESSIVE DISORDER: ICD-10-CM

## 2025-01-08 LAB — THYROID STIMULATING IMMUNOGLOBULIN: <89 % BASELINE

## 2025-01-08 PROCEDURE — RXMED WILLOW AMBULATORY MEDICATION CHARGE

## 2025-01-08 RX ORDER — SERTRALINE HYDROCHLORIDE 100 MG/1
200 TABLET, FILM COATED ORAL DAILY
Qty: 240 TABLET | Refills: 0 | Status: SHIPPED | OUTPATIENT
Start: 2025-01-08 | End: 2025-05-08

## 2025-01-08 RX ORDER — BUPROPION HYDROCHLORIDE 150 MG/1
150 TABLET ORAL DAILY
Qty: 90 TABLET | Refills: 0 | OUTPATIENT
Start: 2025-01-08 | End: 2025-04-08

## 2025-01-09 ENCOUNTER — PHARMACY VISIT (OUTPATIENT)
Dept: PHARMACY | Facility: CLINIC | Age: 50
End: 2025-01-09
Payer: COMMERCIAL

## 2025-01-09 ENCOUNTER — APPOINTMENT (OUTPATIENT)
Dept: PRIMARY CARE | Facility: CLINIC | Age: 50
End: 2025-01-09
Payer: COMMERCIAL

## 2025-01-13 DIAGNOSIS — F33.1 MODERATE EPISODE OF RECURRENT MAJOR DEPRESSIVE DISORDER: ICD-10-CM

## 2025-01-13 PROCEDURE — RXMED WILLOW AMBULATORY MEDICATION CHARGE

## 2025-01-13 RX ORDER — BUPROPION HYDROCHLORIDE 150 MG/1
150 TABLET ORAL DAILY
Qty: 90 TABLET | Refills: 0 | Status: CANCELLED | OUTPATIENT
Start: 2025-01-13 | End: 2025-04-13

## 2025-01-13 RX ORDER — BUPROPION HYDROCHLORIDE 300 MG/1
300 TABLET ORAL DAILY
Qty: 90 TABLET | Refills: 0 | OUTPATIENT
Start: 2025-01-13 | End: 2025-04-13

## 2025-01-13 RX ORDER — BUPROPION HYDROCHLORIDE 150 MG/1
150 TABLET ORAL DAILY
Qty: 90 TABLET | Refills: 0 | OUTPATIENT
Start: 2025-01-13 | End: 2025-04-13

## 2025-01-13 RX ORDER — BUPROPION HYDROCHLORIDE 300 MG/1
300 TABLET ORAL DAILY
Qty: 90 TABLET | Refills: 0 | Status: CANCELLED | OUTPATIENT
Start: 2025-01-13 | End: 2025-04-13

## 2025-01-17 ENCOUNTER — PHARMACY VISIT (OUTPATIENT)
Dept: PHARMACY | Facility: CLINIC | Age: 50
End: 2025-01-17
Payer: COMMERCIAL

## 2025-01-23 ENCOUNTER — APPOINTMENT (OUTPATIENT)
Dept: RADIOLOGY | Facility: HOSPITAL | Age: 50
End: 2025-01-23
Payer: COMMERCIAL

## 2025-01-24 ENCOUNTER — APPOINTMENT (OUTPATIENT)
Dept: RADIOLOGY | Facility: HOSPITAL | Age: 50
End: 2025-01-24
Payer: COMMERCIAL

## 2025-01-30 ENCOUNTER — APPOINTMENT (OUTPATIENT)
Dept: BEHAVIORAL HEALTH | Facility: CLINIC | Age: 50
End: 2025-01-30
Payer: COMMERCIAL

## 2025-01-30 DIAGNOSIS — F42.8 OBSESSIVE THINKING: ICD-10-CM

## 2025-01-30 DIAGNOSIS — F33.1 MODERATE EPISODE OF RECURRENT MAJOR DEPRESSIVE DISORDER: ICD-10-CM

## 2025-01-30 DIAGNOSIS — G47.9 DIFFICULTY SLEEPING: ICD-10-CM

## 2025-01-30 DIAGNOSIS — F33.8 SEASONAL DEPRESSION (CMS-HCC): Primary | ICD-10-CM

## 2025-01-30 DIAGNOSIS — F41.1 GAD (GENERALIZED ANXIETY DISORDER): ICD-10-CM

## 2025-01-30 PROCEDURE — 99214 OFFICE O/P EST MOD 30 MIN: CPT | Performed by: NURSE PRACTITIONER

## 2025-01-30 PROCEDURE — 1036F TOBACCO NON-USER: CPT | Performed by: NURSE PRACTITIONER

## 2025-01-30 RX ORDER — BUPROPION HYDROCHLORIDE 150 MG/1
150 TABLET ORAL DAILY
Qty: 90 TABLET | Refills: 3 | Status: SHIPPED | OUTPATIENT
Start: 2025-01-30 | End: 2026-01-30

## 2025-01-30 RX ORDER — BUPROPION HYDROCHLORIDE 300 MG/1
300 TABLET ORAL DAILY
Qty: 90 TABLET | Refills: 3 | Status: SHIPPED | OUTPATIENT
Start: 2025-01-30 | End: 2026-01-30

## 2025-01-30 RX ORDER — DULOXETIN HYDROCHLORIDE 30 MG/1
30 CAPSULE, DELAYED RELEASE ORAL DAILY
Qty: 60 CAPSULE | Refills: 0 | Status: SHIPPED | OUTPATIENT
Start: 2025-01-30 | End: 2025-03-31

## 2025-01-30 ASSESSMENT — ANXIETY QUESTIONNAIRES
4. TROUBLE RELAXING: SEVERAL DAYS
3. WORRYING TOO MUCH ABOUT DIFFERENT THINGS: SEVERAL DAYS
6. BECOMING EASILY ANNOYED OR IRRITABLE: SEVERAL DAYS
2. NOT BEING ABLE TO STOP OR CONTROL WORRYING: NOT AT ALL
1. FEELING NERVOUS, ANXIOUS, OR ON EDGE: NOT AT ALL
IF YOU CHECKED OFF ANY PROBLEMS ON THIS QUESTIONNAIRE, HOW DIFFICULT HAVE THESE PROBLEMS MADE IT FOR YOU TO DO YOUR WORK, TAKE CARE OF THINGS AT HOME, OR GET ALONG WITH OTHER PEOPLE: NOT DIFFICULT AT ALL
GAD7 TOTAL SCORE: 3
7. FEELING AFRAID AS IF SOMETHING AWFUL MIGHT HAPPEN: NOT AT ALL
5. BEING SO RESTLESS THAT IT IS HARD TO SIT STILL: NOT AT ALL

## 2025-01-30 ASSESSMENT — PATIENT HEALTH QUESTIONNAIRE - PHQ9
10. IF YOU CHECKED OFF ANY PROBLEMS, HOW DIFFICULT HAVE THESE PROBLEMS MADE IT FOR YOU TO DO YOUR WORK, TAKE CARE OF THINGS AT HOME, OR GET ALONG WITH OTHER PEOPLE: VERY DIFFICULT
6. FEELING BAD ABOUT YOURSELF - OR THAT YOU ARE A FAILURE OR HAVE LET YOURSELF OR YOUR FAMILY DOWN: NOT AT ALL
7. TROUBLE CONCENTRATING ON THINGS, SUCH AS READING THE NEWSPAPER OR WATCHING TELEVISION: MORE THAN HALF THE DAYS
4. FEELING TIRED OR HAVING LITTLE ENERGY: NEARLY EVERY DAY
2. FEELING DOWN, DEPRESSED OR HOPELESS: MORE THAN HALF THE DAYS
9. THOUGHTS THAT YOU WOULD BE BETTER OFF DEAD, OR OF HURTING YOURSELF: NOT AT ALL
8. MOVING OR SPEAKING SO SLOWLY THAT OTHER PEOPLE COULD HAVE NOTICED. OR THE OPPOSITE, BEING SO FIGETY OR RESTLESS THAT YOU HAVE BEEN MOVING AROUND A LOT MORE THAN USUAL: SEVERAL DAYS
1. LITTLE INTEREST OR PLEASURE IN DOING THINGS: NEARLY EVERY DAY
3. TROUBLE FALLING OR STAYING ASLEEP OR SLEEPING TOO MUCH: MORE THAN HALF THE DAYS
5. POOR APPETITE OR OVEREATING: MORE THAN HALF THE DAYS

## 2025-01-30 ASSESSMENT — ENCOUNTER SYMPTOMS
DYSPHORIC MOOD: 1
NERVOUS/ANXIOUS: 1

## 2025-01-30 NOTE — PROGRESS NOTES
"Adult Ambulatory Psychiatry Progress Note      Assessment/Plan     Impression:  Suzy Bah is a 49 y.o. 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 Around the holidays I was feeling about more blue like my usual even though I was having a great time with my family. Otherwise I have been feeling pretty good. Work is going well, and my new place I am settled in.\"    Plan: Patient was calm, but reserved. Indicated depression/seasonal depression has been impacting her and that she feels it has been worse this winter. Reviewed and agreed to switching from Zoloft to Cymbalta, but leave other medications and treatment plan in place. Encouraged patient to seek out a therapist.    Medication: Starts tapering off of Zoloft 200mg - reducing dose by 50mg every 3-5 days. During final 50mg dose of taper, patient starts taking Cymbalta 30mg every day. Trazodone 25-50mg PRN/HS, Wellbutrin XL 450mg every day.     Patient is reminded that if there is SI to call [phone] and get themselves to the closest ED for evaluation, otherwise contact me for other questions/concerns.     Diagnoses and all orders for this visit:  Seasonal depression (CMS-HCC)  -     DULoxetine (Cymbalta) 30 mg DR capsule; Take 1 capsule (30 mg) by mouth once daily. Do not crush or chew. Start taking during the last days of Zoloft at 50mg dose.  Moderate episode of recurrent major depressive disorder  -     buPROPion XL (Wellbutrin XL) 300 mg 24 hr tablet; Take 1 tablet (300 mg) by mouth once daily.  -     buPROPion XL (Wellbutrin XL) 150 mg 24 hr tablet; Take 1 tablet (150 mg) by mouth once daily. To be taken with current 300mg dose for a total of 450mg daily.  -     DULoxetine (Cymbalta) 30 mg DR capsule; Take 1 capsule (30 mg) by mouth once daily. Do not crush or chew. Start taking during the last days of Zoloft at 50mg dose.  TRACI (generalized anxiety " disorder)  -     DULoxetine (Cymbalta) 30 mg DR capsule; Take 1 capsule (30 mg) by mouth once daily. Do not crush or chew. Start taking during the last days of Zoloft at 50mg dose.  Obsessive thinking  Difficulty sleeping      Therapy: none    Other: n/a    Patient is reminded that if there is SI to call [phone] and get themselves to the closest ED for evaluation, otherwise contact me for other questions/concerns.     Subjective   HPI:     Virtual Consent    An interactive audio and video telecommunication system which permits real time communications between the patient (at work) and provider (at home office) was utilized to provide this telehealth service.   Verbal consent was requested and obtained from Suzy Bah on this date, 01/30/2025 for a telehealth visit.    Present Illness - depression     Characteristics/Recent psychiatric symptoms (pertinent positives and negatives) - reports work stress is there, and now the office is short staffed and a new person was hired and is starting next week. Admits because of the short staffing, and the amount of patients coming in and the amount of extra duties that are then needing to be done, longer hours and more work had been required of her and the others. Currently there are only 3 staff members with the office, and the fourth starting next week, but they would function best with a 5th person and open to hiring. Reports the person from last Spring whom was bringing negativity to the office, and bringing the morale of everyone down, which the patient reported to the manager, quit only a month later in June. Reports depression was better over the summer because of sunshine and warmer weather, but also because of the feeling supported at work, but admits noticing the increased depression/seasonal depression started around late Fall/early winter and has since only progressed. Enjoyed the holidays with her family for that time period, but the underlying depression put a  damper on her mood 'and I felt bleh.' Reports anhedonia is severe, unmotivated to do anything once she gets home. Energy levels are consistently low. Admits feeling mentally tired will impact her at the end of the day, and goes home after work and 'collapses and do nothing.' Reports physical fatigue is something she has to push through pretty much all day long. Reports on weekends she is mentally/physically fatigued all the time and 'that is frustrating'. Sleep averages 5 hours during weeknights, and on weekends 10 hrs a night. Appetite is poor but if when her family 'it's good.' Admits her sister got her for Xmas a cookbook and a dish for one and she knows it would be good for her, but she has only been only eating fast food or microwaveable. Will eat at least 2 meals a day. Admits not noticing as many racing, obsessive, ruminating or intrusive thoughts lately. Memory issues/brain fog are 'about the same. But not worse.' Notices that if she gets overwhelmed at work, she will have trouble staying on task, prioritizing them and sticking to them, and can get easily distracted.      Onset/timeframe - 4 weeks  Type - seasonal depression  Duration - daily  Aggravating and/or relieving factors/triggers - changes in weather (cool/grey weather and rain) but feeling overwhelmed with work stress  Treatment and treatment changes (new meds, dosage increases or decreases, med compliance, therapy frequency, etc.) (Past and Recent) - Zoloft 200mg QD, Trazodone 25-50mg PRN, Wellbutrin XL 300mg QD. Still looking for therapist.     Issues: Denies SI/HI/AVH currently.      Review of Systems   Psychiatric/Behavioral:  Positive for dysphoric mood. The patient is nervous/anxious.        Objective   Mental Status Exam:  General Appearance: Fairly groomed  Attitude/Behavior: Cooperative, Guarded, Distracted  Motor: No psychomotor agitation or retardation, no tremor or other abnormal movements  Speech: Normal rate, volume,  prosody  Gait/Station: Other:(comment) (sitting in office at work, over virtual connection)  Mood: 'ok, but down'  Affect: Dysphoric, constricted but reactive, Anxious, Congruent with mood and topic of conversation  Thought Process: Linear, goal directed, Flight of ideas  Thought Associations: No loosening of associations  Thought Content: Normal, Other: (comment) (overwhelmed at work, winter blues)  Perception: No perceptual abnormalities noted  Sensorium: Alert and oriented to person, place, time and situation  Insight: Fair  Judgement: Intact  Cognition: Cognitively intact to conversational testing with respect to attention, orientation, fund of knowledge, recent and remote memory, and language  Testing: N/A    TRACI-7/PHQ-9 scores reviewed: 3, 15 compared to 6, 9 reflecting improvement in anxiety but an increase in depression.    Current Medications:  Current Outpatient Medications on File Prior to Visit   Medication Sig Dispense Refill    aspirin 81 mg EC tablet Take 1 tablet (81 mg) by mouth 1 time.      ASPIRIN-ACETAMINOPHEN-CAFFEINE ORAL Take by mouth if needed. for migraine      cholecalciferol (Vitamin D3) 25 MCG (1000 UT) capsule Take 1 capsule (25 mcg) by mouth 2 times a day.      cyanocobalamin (Vitamin B-12) 1,000 mcg tablet Take 100 mcg by mouth once daily.      cycloSPORINE (Restasis) 0.05 % ophthalmic emulsion Place 1 drop in both eyes 2 times a day as directed. 180 each 4    docusate sodium (COLACE ORAL) Take 1 capsule by mouth if needed (constipation).      MINOXIDIL TOP Apply topically.      naproxen sodium (Aleve) 220 mg tablet Take 1 tablet (220 mg) by mouth every 12 hours if needed for mild pain (1 - 3).      propranolol (Inderal) 10 mg tablet Take 1 tablet (10 mg) by mouth 2 times a day. 60 tablet 2    traZODone (Desyrel) 50 mg tablet Take by mouth.      [DISCONTINUED] buPROPion XL (Wellbutrin XL) 150 mg 24 hr tablet Take 1 tablet (150 mg) by mouth once daily. To be taken with current 300mg  dose for a total of 450mg daily. 90 tablet 0    [DISCONTINUED] buPROPion XL (Wellbutrin XL) 300 mg 24 hr tablet TAKE 1 TABLET BY MOUTH ONCE DAILY 90 tablet 0    [DISCONTINUED] sertraline (Zoloft) 100 mg tablet Take 2 tablets (200 mg) by mouth once daily. 240 tablet 0     No current facility-administered medications on file prior to visit.       Lab Review:   not applicable      Time Spent:    Prep time: 1 min.  Direct patient time: 30 min.  Documentation time: 6 min.  Total time: 37 min.    Next Appointment:  Follow up in 2 months (on 4/2/2025).

## 2025-01-31 ENCOUNTER — APPOINTMENT (OUTPATIENT)
Dept: PRIMARY CARE | Facility: CLINIC | Age: 50
End: 2025-01-31
Payer: COMMERCIAL

## 2025-02-03 ENCOUNTER — PHARMACY VISIT (OUTPATIENT)
Dept: PHARMACY | Facility: CLINIC | Age: 50
End: 2025-02-03
Payer: COMMERCIAL

## 2025-02-03 PROCEDURE — RXMED WILLOW AMBULATORY MEDICATION CHARGE

## 2025-03-05 ENCOUNTER — APPOINTMENT (OUTPATIENT)
Dept: OPHTHALMOLOGY | Facility: CLINIC | Age: 50
End: 2025-03-05
Payer: COMMERCIAL

## 2025-03-28 ENCOUNTER — APPOINTMENT (OUTPATIENT)
Dept: PRIMARY CARE | Facility: CLINIC | Age: 50
End: 2025-03-28
Payer: COMMERCIAL

## 2025-04-02 ENCOUNTER — APPOINTMENT (OUTPATIENT)
Dept: BEHAVIORAL HEALTH | Facility: CLINIC | Age: 50
End: 2025-04-02
Payer: COMMERCIAL

## 2025-04-02 DIAGNOSIS — F41.1 GAD (GENERALIZED ANXIETY DISORDER): ICD-10-CM

## 2025-04-02 DIAGNOSIS — F33.1 MODERATE EPISODE OF RECURRENT MAJOR DEPRESSIVE DISORDER: ICD-10-CM

## 2025-04-02 DIAGNOSIS — F33.8 SEASONAL DEPRESSION (CMS-HCC): ICD-10-CM

## 2025-04-02 RX ORDER — DULOXETIN HYDROCHLORIDE 30 MG/1
30 CAPSULE, DELAYED RELEASE ORAL DAILY
Qty: 60 CAPSULE | Refills: 0 | Status: CANCELLED | OUTPATIENT
Start: 2025-04-02 | End: 2025-06-01

## 2025-04-03 DIAGNOSIS — F41.1 GAD (GENERALIZED ANXIETY DISORDER): ICD-10-CM

## 2025-04-03 DIAGNOSIS — F33.8 SEASONAL DEPRESSION (CMS-HCC): ICD-10-CM

## 2025-04-03 DIAGNOSIS — F33.1 MODERATE EPISODE OF RECURRENT MAJOR DEPRESSIVE DISORDER: ICD-10-CM

## 2025-04-03 RX ORDER — DULOXETIN HYDROCHLORIDE 30 MG/1
30 CAPSULE, DELAYED RELEASE ORAL DAILY
Qty: 60 CAPSULE | Refills: 0 | Status: SHIPPED | OUTPATIENT
Start: 2025-04-03 | End: 2025-06-03

## 2025-04-04 PROCEDURE — RXMED WILLOW AMBULATORY MEDICATION CHARGE

## 2025-04-07 ENCOUNTER — PHARMACY VISIT (OUTPATIENT)
Dept: PHARMACY | Facility: CLINIC | Age: 50
End: 2025-04-07
Payer: COMMERCIAL

## 2025-04-09 ENCOUNTER — APPOINTMENT (OUTPATIENT)
Dept: BEHAVIORAL HEALTH | Facility: CLINIC | Age: 50
End: 2025-04-09
Payer: COMMERCIAL

## 2025-04-09 DIAGNOSIS — F42.8 OBSESSIVE THINKING: ICD-10-CM

## 2025-04-09 DIAGNOSIS — G47.9 DIFFICULTY SLEEPING: ICD-10-CM

## 2025-04-09 DIAGNOSIS — F33.1 MODERATE EPISODE OF RECURRENT MAJOR DEPRESSIVE DISORDER: Primary | ICD-10-CM

## 2025-04-09 DIAGNOSIS — F33.8 SEASONAL DEPRESSION (CMS-HCC): ICD-10-CM

## 2025-04-09 DIAGNOSIS — F41.1 GAD (GENERALIZED ANXIETY DISORDER): ICD-10-CM

## 2025-04-09 PROCEDURE — 99214 OFFICE O/P EST MOD 30 MIN: CPT | Performed by: NURSE PRACTITIONER

## 2025-04-09 ASSESSMENT — ENCOUNTER SYMPTOMS
DYSPHORIC MOOD: 1
NERVOUS/ANXIOUS: 1

## 2025-04-09 NOTE — PROGRESS NOTES
"Adult Ambulatory Psychiatry Progress Note      Assessment/Plan     Impression:  Suzy Bah is a 49 y.o. 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 Things have been up and down. Some days and weeks are good, and then a few days feeling depressed and then I feel good again, and it has been a rollercoaster. I got mixed up with coming off of the Zoloft so honestly I have only been on the Cymbalta for 2 weeks.\"    Plan: Patient was calm, but reserved. Indicated having forgotten to cross taper Cymbalta from Zoloft until just about 2 weeks ago and has been dealing with depression/seasonal depression off and on for the past 2 or so months still. Reviewed and agreed to leaving medications and treatment plan in place. Encouraged patient to still seek out a therapist when ready.    Medication: Cymbalta 30mg every day. Trazodone 25-50mg PRN/HS, Wellbutrin XL 450mg every day.     Patient is reminded that if there is SI to call [phone] and get themselves to the closest ED for evaluation, otherwise contact me for other questions/concerns.     Diagnoses and all orders for this visit:  Moderate episode of recurrent major depressive disorder  Obsessive thinking  Seasonal depression (CMS-HCC)  TRACI (generalized anxiety disorder)  Difficulty sleeping        Therapy: none    Other: n/a    Patient is reminded that if there is SI to call [phone] and get themselves to the closest ED for evaluation, otherwise contact me for other questions/concerns.     Subjective   HPI:     Virtual Consent    An interactive audio and video telecommunication system which permits real time communications between the patient (at work) and provider (at home office) was utilized to provide this telehealth service.   Verbal consent was requested and obtained from Suzy Bah on this date, 04/09/2025 for a telehealth visit.    Present Illness - depression   " "  Characteristics/Recent psychiatric symptoms (pertinent positives and negatives) - reports the winter was rougher than usual  - cold, wet, gray and dreary out, so it did make her seasonal depression harder to manage over the past 3 months. Reports recently was in [place] visiting with her parents for 10 days and sun and warm had helped improve along being around her family helped her mood considerably, but then being back home, and especially this past weekend which was cold, wet and dreary out, brought her mood back down. Anxiety is 'better'. Depression is 'don't want to do anything, so I don't do anything.' Stays in bed longer. No 'oomph.' No energy, drive, motivation. Has no energy to even talk on the phone and pushes even simple tasks off to the side (eg bags from her vacation remain unpacked. \"I don't care and I am not doing anything.\"). Able to read books, but TV viewing is nominal and keeping social media/news watching to minimum and talks with her sister to keep herself apprised of the political situation unfolding as the current administration is upsetting. Sleep has ranged from too little during weeknights and too much on the weekends. Will use the light to wake up during the weekdays to wake up. Week nights are 5 hrs and weekends are 10-12 hrs. Energy levels are low 'all the time' and has to push herself when at work, and feels mental/physical fatigue after work hits her like a rock. On weekends feels tired all the time. Appetite is 'good. I will eat simple stuff but I don't have the energy to make really good meals.' Reports improvement with racing, obsessive, ruminating thoughts as she is able to push through them quickly admitting lingering on them is not healthy.      Onset/timeframe - 2 months  Type - seasonal depression/depression  Duration - daily  Aggravating and/or relieving factors/triggers - changes in weather (cool/grey weather and rain) and forgetting to switch over to Cymbalta earlier as " prescribed  Treatment and treatment changes (new meds, dosage increases or decreases, med compliance, therapy frequency, etc.) (Past and Recent) - Cymbalta 30mg QD, Trazodone 25-50mg PRN, Wellbutrin XL 300mg QD. Still looking for therapist.     Issues: Denies SI/HI/AVH currently.      Review of Systems   Psychiatric/Behavioral:  Positive for dysphoric mood. The patient is nervous/anxious.        Objective   Mental Status Exam:  General Appearance: Well groomed, appropriate eye contact  Attitude/Behavior: Cooperative, Distracted  Motor: No psychomotor agitation or retardation, no tremor or other abnormal movements  Speech: Normal rate, volume, prosody  Gait/Station: Other:(comment) (sitting in living room, at home, over virtual connection)  Mood: 'ok right now'  Affect: Dysphoric, constricted but reactive, Anxious, Blunted, Congruent with mood and topic of conversation  Thought Process: Linear, goal directed, Flight of ideas  Thought Associations: No loosening of associations  Thought Content: Normal, Other: (comment) (gray skies can bring her mood down, and only recent switched to Cymbalta forgetting to start cross taper with Zoloft earlier until recently.)  Perception: No perceptual abnormalities noted  Sensorium: Alert and oriented to person, place, time and situation  Insight: Fair  Judgement: Fair  Cognition: Cognitively intact to conversational testing with respect to attention, orientation, fund of knowledge, recent and remote memory, and language  Testing: N/A    TRACI-7/PHQ-9 scores reviewed: 3, 15 compared to 6, 9 reflecting improvement in anxiety but an increase in depression.    Current Medications:  Current Outpatient Medications on File Prior to Visit   Medication Sig Dispense Refill    aspirin 81 mg EC tablet Take 1 tablet (81 mg) by mouth 1 time.      ASPIRIN-ACETAMINOPHEN-CAFFEINE ORAL Take by mouth if needed. for migraine      buPROPion XL (Wellbutrin XL) 150 mg 24 hr tablet Take 1 tablet (150 mg) by  mouth once daily. To be taken with current 300mg dose for a total of 450mg daily. 90 tablet 3    buPROPion XL (Wellbutrin XL) 300 mg 24 hr tablet Take 1 tablet (300 mg) by mouth once daily. 90 tablet 3    cholecalciferol (Vitamin D3) 25 MCG (1000 UT) capsule Take 1 capsule (25 mcg) by mouth 2 times a day.      cyanocobalamin (Vitamin B-12) 1,000 mcg tablet Take 100 mcg by mouth once daily.      cycloSPORINE (Restasis) 0.05 % ophthalmic emulsion Place 1 drop in both eyes 2 times a day as directed. 180 each 4    docusate sodium (COLACE ORAL) Take 1 capsule by mouth if needed (constipation).      DULoxetine (Cymbalta) 30 mg DR capsule Take 1 capsule (30 mg) by mouth once daily. Do not crush or chew. Start taking during the last days of Zoloft at 50mg dose. 60 capsule 0    MINOXIDIL TOP Apply topically.      naproxen sodium (Aleve) 220 mg tablet Take 1 tablet (220 mg) by mouth every 12 hours if needed for mild pain (1 - 3).      traZODone (Desyrel) 50 mg tablet Take by mouth.      propranolol (Inderal) 10 mg tablet Take 1 tablet (10 mg) by mouth 2 times a day. 60 tablet 2     No current facility-administered medications on file prior to visit.       Lab Review:   not applicable      Time Spent:    Prep time: 1 min.  Direct patient time: 28 min.  Documentation time: 6 min.  Total time: 35 min.    Next Appointment:  Follow up in 6 weeks (on 5/22/2025).

## 2025-04-25 ENCOUNTER — APPOINTMENT (OUTPATIENT)
Dept: PRIMARY CARE | Facility: CLINIC | Age: 50
End: 2025-04-25
Payer: COMMERCIAL

## 2025-04-25 VITALS
HEIGHT: 68 IN | OXYGEN SATURATION: 99 % | SYSTOLIC BLOOD PRESSURE: 110 MMHG | WEIGHT: 171 LBS | RESPIRATION RATE: 16 BRPM | HEART RATE: 102 BPM | BODY MASS INDEX: 25.91 KG/M2 | TEMPERATURE: 98 F | DIASTOLIC BLOOD PRESSURE: 60 MMHG

## 2025-04-25 DIAGNOSIS — Z23 NEED FOR TDAP VACCINATION: ICD-10-CM

## 2025-04-25 DIAGNOSIS — H61.23 BILATERAL IMPACTED CERUMEN: ICD-10-CM

## 2025-04-25 DIAGNOSIS — Z12.31 BREAST CANCER SCREENING BY MAMMOGRAM: ICD-10-CM

## 2025-04-25 DIAGNOSIS — G47.30 SLEEP APNEA, UNSPECIFIED TYPE: ICD-10-CM

## 2025-04-25 DIAGNOSIS — F33.1 MODERATE EPISODE OF RECURRENT MAJOR DEPRESSIVE DISORDER: ICD-10-CM

## 2025-04-25 DIAGNOSIS — F41.1 GAD (GENERALIZED ANXIETY DISORDER): ICD-10-CM

## 2025-04-25 DIAGNOSIS — Z00.00 HEALTHCARE MAINTENANCE: Primary | ICD-10-CM

## 2025-04-25 DIAGNOSIS — E55.9 VITAMIN D DEFICIENCY: ICD-10-CM

## 2025-04-25 PROCEDURE — 90715 TDAP VACCINE 7 YRS/> IM: CPT | Performed by: INTERNAL MEDICINE

## 2025-04-25 PROCEDURE — 99396 PREV VISIT EST AGE 40-64: CPT | Performed by: INTERNAL MEDICINE

## 2025-04-25 PROCEDURE — 90471 IMMUNIZATION ADMIN: CPT | Performed by: INTERNAL MEDICINE

## 2025-04-25 PROCEDURE — 1036F TOBACCO NON-USER: CPT | Performed by: INTERNAL MEDICINE

## 2025-04-25 PROCEDURE — 3008F BODY MASS INDEX DOCD: CPT | Performed by: INTERNAL MEDICINE

## 2025-04-25 ASSESSMENT — ENCOUNTER SYMPTOMS
RESPIRATORY NEGATIVE: 1
SEIZURES: 0
EYE PAIN: 0
PSYCHIATRIC NEGATIVE: 1
NUMBNESS: 0
STRIDOR: 0
EYES NEGATIVE: 1
WOUND: 0
UNEXPECTED WEIGHT CHANGE: 0
FACIAL ASYMMETRY: 0
BLOOD IN STOOL: 0
POLYPHAGIA: 0
HEMATOLOGIC/LYMPHATIC NEGATIVE: 1
SPEECH DIFFICULTY: 0
DIARRHEA: 0
LIGHT-HEADEDNESS: 0
FREQUENCY: 0
MYALGIAS: 0
MUSCULOSKELETAL NEGATIVE: 1
JOINT SWELLING: 0
ADENOPATHY: 0
NECK PAIN: 0
APPETITE CHANGE: 0
EYE DISCHARGE: 0
DIFFICULTY URINATING: 0
TROUBLE SWALLOWING: 0
COLOR CHANGE: 0
ENDOCRINE NEGATIVE: 1
TREMORS: 0
WEAKNESS: 0
DIZZINESS: 0
NERVOUS/ANXIOUS: 0
NAUSEA: 0
NECK STIFFNESS: 0
CONFUSION: 0
SLEEP DISTURBANCE: 0
HALLUCINATIONS: 0
PALPITATIONS: 0
VOMITING: 0
ACTIVITY CHANGE: 0
ALLERGIC/IMMUNOLOGIC NEGATIVE: 1
SHORTNESS OF BREATH: 0
AGITATION: 0
POLYDIPSIA: 0
CHEST TIGHTNESS: 0
VOICE CHANGE: 0
SORE THROAT: 0
CONSTIPATION: 0
ARTHRALGIAS: 0
BACK PAIN: 0
BRUISES/BLEEDS EASILY: 0
GASTROINTESTINAL NEGATIVE: 1
DYSURIA: 0
HEADACHES: 0
SINUS PAIN: 0
COUGH: 0
EYE REDNESS: 0
FLANK PAIN: 0
CONSTITUTIONAL NEGATIVE: 1
SINUS PRESSURE: 0
NEUROLOGICAL NEGATIVE: 1
CARDIOVASCULAR NEGATIVE: 1
WHEEZING: 0
ABDOMINAL PAIN: 0

## 2025-04-25 ASSESSMENT — PATIENT HEALTH QUESTIONNAIRE - PHQ9
SUM OF ALL RESPONSES TO PHQ9 QUESTIONS 1 AND 2: 4
2. FEELING DOWN, DEPRESSED OR HOPELESS: MORE THAN HALF THE DAYS
1. LITTLE INTEREST OR PLEASURE IN DOING THINGS: MORE THAN HALF THE DAYS

## 2025-04-25 NOTE — PROGRESS NOTES
Subjective   Patient ID: Suzy Bah is a 49 y.o. female who presents for Follow-up (Pt is here due to a follow up).    HPI     Patient has been feeling pretty good and has been complaint with prescribed medications.    Concerned about feeling tired, often in the morning after night sleep.  Feels that her sleep is not restorative. She is aware about snoring and having short episodes of apnea while sleeping.  Her mother was diagnosed with sleep apnea.  We will arrange home sleep test.       We reviewed and discussed details of recent blood work: CBC, CMP, TSH, Lipid panel,  Vit D done in 2022 Results within acceptable range.    Last mammogram: 2022  PAP: per GYN  Colon ca screening: Cologuard: 4/2023     Follows with endocrinology Dr. Trimble regarding thyroid nodule.    Follows with , symptoms controlled with current medications.     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      Review of Systems   Constitutional: Negative.  Negative for activity change, appetite change and unexpected weight change.   HENT: Negative.  Negative for congestion, ear discharge, ear pain, hearing loss, mouth sores, nosebleeds, sinus pressure, sinus pain, sore throat, trouble swallowing and voice change.    Eyes: Negative.  Negative for pain, discharge, redness and visual disturbance.   Respiratory: Negative.  Negative for cough, chest tightness, shortness of breath, wheezing and stridor.    Cardiovascular: Negative.  Negative for chest pain, palpitations and leg swelling.   Gastrointestinal: Negative.  Negative for abdominal pain, blood in stool, constipation, diarrhea, nausea and vomiting.   Endocrine: Negative.  Negative for polydipsia, polyphagia and polyuria.   Genitourinary: Negative.  Negative for difficulty urinating, dysuria, flank pain, frequency and urgency.   Musculoskeletal: Negative.  Negative for arthralgias, back pain, gait problem, joint swelling, myalgias, neck pain and neck stiffness.   Skin:  "Negative.  Negative for color change, rash and wound.   Allergic/Immunologic: Negative.  Negative for environmental allergies, food allergies and immunocompromised state.   Neurological: Negative.  Negative for dizziness, tremors, seizures, syncope, facial asymmetry, speech difficulty, weakness, light-headedness, numbness and headaches.   Hematological: Negative.  Negative for adenopathy. Does not bruise/bleed easily.   Psychiatric/Behavioral: Negative.  Negative for agitation, behavioral problems, confusion, hallucinations, sleep disturbance and suicidal ideas. The patient is not nervous/anxious.    All other systems reviewed and are negative.      Objective   /60 (BP Location: Left arm, Patient Position: Sitting, BP Cuff Size: Adult)   Pulse 102   Temp 36.7 °C (98 °F) (Temporal)   Resp 16   Ht 1.727 m (5' 8\")   Wt 77.6 kg (171 lb)   SpO2 99%   BMI 26.00 kg/m²     Physical Exam  Vitals and nursing note reviewed.   Constitutional:       General: She is not in acute distress.     Appearance: Normal appearance.   HENT:      Head: Normocephalic and atraumatic.      Right Ear: External ear normal. There is impacted cerumen.      Left Ear: External ear normal. There is impacted cerumen.      Nose: Nose normal. No congestion or rhinorrhea.      Mouth/Throat:      Mouth: Mucous membranes are moist.      Pharynx: Oropharynx is clear.   Eyes:      General:         Right eye: No discharge.         Left eye: No discharge.      Extraocular Movements: Extraocular movements intact.      Conjunctiva/sclera: Conjunctivae normal.      Pupils: Pupils are equal, round, and reactive to light.   Cardiovascular:      Rate and Rhythm: Normal rate and regular rhythm.      Pulses: Normal pulses.      Heart sounds: Normal heart sounds. No murmur heard.     No friction rub. No gallop.   Pulmonary:      Effort: Pulmonary effort is normal. No respiratory distress.      Breath sounds: Normal breath sounds. No stridor. No wheezing, " rhonchi or rales.   Chest:      Chest wall: No tenderness.   Abdominal:      General: Bowel sounds are normal.      Palpations: Abdomen is soft. There is no mass.      Tenderness: There is no abdominal tenderness. There is no guarding or rebound.   Musculoskeletal:         General: No swelling or deformity. Normal range of motion.      Cervical back: Normal range of motion and neck supple. No rigidity or tenderness.      Right lower leg: No edema.      Left lower leg: No edema.   Lymphadenopathy:      Cervical: No cervical adenopathy.   Skin:     General: Skin is warm and dry.      Coloration: Skin is not jaundiced.      Findings: No bruising or erythema.   Neurological:      General: No focal deficit present.      Mental Status: She is alert and oriented to person, place, and time. Mental status is at baseline.      Cranial Nerves: No cranial nerve deficit.      Motor: No weakness.      Coordination: Coordination normal.      Gait: Gait normal.   Psychiatric:         Mood and Affect: Mood normal.         Behavior: Behavior normal.         Thought Content: Thought content normal.         Judgment: Judgment normal.         Assessment/Plan   Problem List Items Addressed This Visit           ICD-10-CM       ENT    Impacted cerumen H61.20    Use Debrox ear drops as directed, as needed, consult ENT if needed.            Endocrine/Metabolic    Vitamin D deficiency E55.9    Relevant Orders    Vitamin D 25-Hydroxy,Total (for eval of Vitamin D levels)       Health Encounters    Healthcare maintenance - Primary Z00.00    Relevant Orders    CBC    Comprehensive Metabolic Panel    Lipid Panel    Urinalysis with Reflex Microscopic       Infectious Diseases    Need for Tdap vaccination Z23    Relevant Orders    Tdap vaccine, age 7 years and older (Completed)       Mental Health    Major depressive disorder, recurrent episode F33.9    Stable. F/up with .         TRACI (generalized anxiety disorder) F41.1    Stable. F/up with .             Sleep    Sleep apnea G47.30    Relevant Orders    Home sleep apnea test (HSAT)     Other Visit Diagnoses         Codes      Breast cancer screening by mammogram     Z12.31    Relevant Orders    BI mammo bilateral screening tomosynthesis        It was a pleasure to see you today.  I would like to remind you about importance of a healthy lifestyle in order to improve your well-being and live longer.  Try to engage in physical activities for at least 150 minutes per week.  Eat about 10 servings of fruits and vegetables daily. My advice is 2 servings of fruits and 8 servings of vegetables.  For vegetables choose at least half of them green and at least half of them fresh.  Please avoid sugar, salt, fried food and saturated fat.    F/up in 1 year or sooner if needed.

## 2025-04-29 PROCEDURE — RXMED WILLOW AMBULATORY MEDICATION CHARGE

## 2025-04-30 ENCOUNTER — PHARMACY VISIT (OUTPATIENT)
Dept: PHARMACY | Facility: CLINIC | Age: 50
End: 2025-04-30
Payer: COMMERCIAL

## 2025-05-15 DIAGNOSIS — E05.90 HYPERTHYROIDISM: ICD-10-CM

## 2025-05-15 DIAGNOSIS — E04.9 ENLARGED THYROID GLAND: ICD-10-CM

## 2025-05-15 DIAGNOSIS — E55.9 VITAMIN D DEFICIENCY: ICD-10-CM

## 2025-05-15 DIAGNOSIS — E04.1 THYROID NODULE: ICD-10-CM

## 2025-05-22 ENCOUNTER — APPOINTMENT (OUTPATIENT)
Dept: BEHAVIORAL HEALTH | Facility: CLINIC | Age: 50
End: 2025-05-22
Payer: COMMERCIAL

## 2025-05-22 DIAGNOSIS — F33.8 SEASONAL DEPRESSION: ICD-10-CM

## 2025-05-22 DIAGNOSIS — G47.9 DIFFICULTY SLEEPING: ICD-10-CM

## 2025-05-22 DIAGNOSIS — F42.8 OBSESSIVE THINKING: ICD-10-CM

## 2025-05-22 DIAGNOSIS — F33.1 MODERATE EPISODE OF RECURRENT MAJOR DEPRESSIVE DISORDER: Primary | ICD-10-CM

## 2025-05-22 DIAGNOSIS — F41.1 GAD (GENERALIZED ANXIETY DISORDER): ICD-10-CM

## 2025-05-22 PROCEDURE — 99214 OFFICE O/P EST MOD 30 MIN: CPT | Performed by: NURSE PRACTITIONER

## 2025-05-22 RX ORDER — DULOXETIN HYDROCHLORIDE 30 MG/1
30 CAPSULE, DELAYED RELEASE ORAL DAILY
Qty: 60 CAPSULE | Refills: 0 | Status: SHIPPED | OUTPATIENT
Start: 2025-05-22 | End: 2025-07-21

## 2025-05-22 ASSESSMENT — ANXIETY QUESTIONNAIRES
5. BEING SO RESTLESS THAT IT IS HARD TO SIT STILL: SEVERAL DAYS
6. BECOMING EASILY ANNOYED OR IRRITABLE: SEVERAL DAYS
GAD7 TOTAL SCORE: 4
2. NOT BEING ABLE TO STOP OR CONTROL WORRYING: NOT AT ALL
4. TROUBLE RELAXING: MORE THAN HALF THE DAYS
IF YOU CHECKED OFF ANY PROBLEMS ON THIS QUESTIONNAIRE, HOW DIFFICULT HAVE THESE PROBLEMS MADE IT FOR YOU TO DO YOUR WORK, TAKE CARE OF THINGS AT HOME, OR GET ALONG WITH OTHER PEOPLE: SOMEWHAT DIFFICULT
1. FEELING NERVOUS, ANXIOUS, OR ON EDGE: NOT AT ALL
3. WORRYING TOO MUCH ABOUT DIFFERENT THINGS: NOT AT ALL
7. FEELING AFRAID AS IF SOMETHING AWFUL MIGHT HAPPEN: NOT AT ALL

## 2025-05-22 ASSESSMENT — PATIENT HEALTH QUESTIONNAIRE - PHQ9
5. POOR APPETITE OR OVEREATING: SEVERAL DAYS
9. THOUGHTS THAT YOU WOULD BE BETTER OFF DEAD, OR OF HURTING YOURSELF: NOT AT ALL
3. TROUBLE FALLING OR STAYING ASLEEP OR SLEEPING TOO MUCH: SEVERAL DAYS
7. TROUBLE CONCENTRATING ON THINGS, SUCH AS READING THE NEWSPAPER OR WATCHING TELEVISION: SEVERAL DAYS
2. FEELING DOWN, DEPRESSED OR HOPELESS: SEVERAL DAYS
4. FEELING TIRED OR HAVING LITTLE ENERGY: NEARLY EVERY DAY
1. LITTLE INTEREST OR PLEASURE IN DOING THINGS: SEVERAL DAYS
6. FEELING BAD ABOUT YOURSELF - OR THAT YOU ARE A FAILURE OR HAVE LET YOURSELF OR YOUR FAMILY DOWN: NOT AT ALL
10. IF YOU CHECKED OFF ANY PROBLEMS, HOW DIFFICULT HAVE THESE PROBLEMS MADE IT FOR YOU TO DO YOUR WORK, TAKE CARE OF THINGS AT HOME, OR GET ALONG WITH OTHER PEOPLE: VERY DIFFICULT
8. MOVING OR SPEAKING SO SLOWLY THAT OTHER PEOPLE COULD HAVE NOTICED. OR THE OPPOSITE, BEING SO FIGETY OR RESTLESS THAT YOU HAVE BEEN MOVING AROUND A LOT MORE THAN USUAL: SEVERAL DAYS

## 2025-05-22 ASSESSMENT — ENCOUNTER SYMPTOMS: DYSPHORIC MOOD: 1

## 2025-05-22 NOTE — PROGRESS NOTES
"Adult Ambulatory Psychiatry Progress Note      Assessment/Plan     Impression:  Suzy Bah is a 49 y.o. 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 I am feeling pretty ok, stable. I am able to do things and enjoy doing them. I think that is a positive.\"    Plan: Patient was calm, and more engaging. While initially not noticing any major improvement, it was more related to lack of motivation with day to day at home tasks after work or on weekends, but feels she is doing well otherwise. Overall reports and presents herself to be stable. Reviewed and agreed to leaving medications and treatment plan in place. Encouraged patient to still seek out a therapist when ready.    Medication: Cymbalta 30mg every day. Trazodone 25-50mg PRN/HS, Wellbutrin XL 450mg every day.     Patient is reminded that if there is SI to call [phone] and get themselves to the closest ED for evaluation, otherwise contact me for other questions/concerns.     Diagnoses and all orders for this visit:  Moderate episode of recurrent major depressive disorder  -     DULoxetine (Cymbalta) 30 mg DR capsule; Take 1 capsule (30 mg) by mouth once daily. Do not crush or chew. Start taking during the last days of Zoloft at 50mg dose.  TRACI (generalized anxiety disorder)  -     DULoxetine (Cymbalta) 30 mg DR capsule; Take 1 capsule (30 mg) by mouth once daily. Do not crush or chew. Start taking during the last days of Zoloft at 50mg dose.  Seasonal depression  Obsessive thinking  -     DULoxetine (Cymbalta) 30 mg DR capsule; Take 1 capsule (30 mg) by mouth once daily. Do not crush or chew. Start taking during the last days of Zoloft at 50mg dose.  Difficulty sleeping          Therapy: none    Other: n/a    Patient is reminded that if there is SI to call [phone] and get themselves to the " closest ED for evaluation, otherwise contact me for other questions/concerns.     Subjective   HPI:     Virtual Consent    An interactive audio and video telecommunication system which permits real time communications between the patient (at work) and provider (at home office) was utilized to provide this telehealth service.   Verbal consent was requested and obtained from Suzy LAND Tigredavid on this date, 05/22/2025 for a telehealth visit.    Present Illness - depression     Characteristics/Recent psychiatric symptoms (pertinent positives and negatives) - reports feeling the dreariness when it is gray, rainy and chill outside, even though it is springtime out, 'and I do notice when the sun is out and warmth is there, I feel peppier'. However notices feeling lower energy, less motivated, struggles to find the focus to get at home tasks done - akin to ADHD paralysis. When at work, she can get her daily tasks done, because it is work that needs to be addressed and accomplished, but once home, motivation is gone. Admits mundane tasks eg writing checks to pay the bills are a literal chore compared to still enjoying reading a book, but the sense of anhedonia is stronger. Reports no concerns with falling/staying asleep, but admits to struggles with getting up in the AM during work week (5-7 hrs) and on weekends will oversleep/lays in bed for longer hours (8-12 hrs). Reveals her PCP, after she reported the brain fog and just general malaise feeling after waking up, ordered an at home sleep study test and she will do the test tonight or tomorrow to get the results and see if she has sleep apnea. Admits right after lunch, she is ready to nap, and has trouble keeping her eyes open and frequently yawns and wonders if she ever did get sleep. Anxiety is still not an issue for her. Energy levels 'are just not there' and mentally/physically fatigued all the time. Appetite is 'poorer. I still have 3 meals a day, but sometimes I may have  cereal for dinner and that is it, because I am so tired.' Reports continued improvement with racing, obsessive, ruminating thoughts or even intrusive thoughts - as she may only experience them about 1 day a week, but they are usually only tied to her concerns over finances, and paying her bills.       Onset/timeframe - 1 month  Type - depression  Duration - situational  Aggravating and/or relieving factors/triggers - overall mood is better, but motivation, mundane tasks outside of work are harder for her to achieve/want to do still  Treatment and treatment changes (new meds, dosage increases or decreases, med compliance, therapy frequency, etc.) (Past and Recent) - Cymbalta 30mg QD, Trazodone 25-50mg PRN, Wellbutrin XL 300mg QD. Still looking for therapist.     Issues: Denies SI/HI/AVH currently.      Review of Systems   Psychiatric/Behavioral:  Positive for dysphoric mood.        Objective   Mental Status Exam:  General Appearance: Well groomed, appropriate eye contact  Attitude/Behavior: Cooperative  Motor: No psychomotor agitation or retardation, no tremor or other abnormal movements  Speech: Normal rate, volume, prosody  Gait/Station: Other:(comment) (sitting at work desk, over virtual connection)  Mood: 'doing ok'  Affect: Congruent with mood and topic of conversation, Euthymic, full-range, Flat  Thought Process: Linear, goal directed  Thought Associations: No loosening of associations  Thought Content: Normal, Other: (comment) (sleep is improved, depression is better, but still struggles with motivation, and desire to get mundane outside of work tasks done)  Perception: No perceptual abnormalities noted  Sensorium: Alert and oriented to person, place, time and situation  Insight: Fair  Judgement: Intact  Cognition: Cognitively intact to conversational testing with respect to attention, orientation, fund of knowledge, recent and remote memory, and language  Testing: N/A    TRACI-7/PHQ-9 scores reviewed: 4, 9  compared to 3, 15 reflecting stable anxiety and improvement in depression.    Current Medications:  Current Outpatient Medications on File Prior to Visit   Medication Sig Dispense Refill    aspirin 81 mg EC tablet Take 1 tablet (81 mg) by mouth 1 time.      ASPIRIN-ACETAMINOPHEN-CAFFEINE ORAL Take by mouth if needed. for migraine      buPROPion XL (Wellbutrin XL) 150 mg 24 hr tablet Take 1 tablet (150 mg) by mouth once daily. To be taken with current 300mg dose for a total of 450mg daily. 90 tablet 3    buPROPion XL (Wellbutrin XL) 300 mg 24 hr tablet Take 1 tablet (300 mg) by mouth once daily. 90 tablet 3    cholecalciferol (Vitamin D3) 25 MCG (1000 UT) capsule Take 1 capsule (25 mcg) by mouth 2 times a day.      cyanocobalamin (Vitamin B-12) 1,000 mcg tablet Take 100 mcg by mouth once daily.      cycloSPORINE (Restasis) 0.05 % ophthalmic emulsion Place 1 drop in both eyes 2 times a day as directed. 180 each 4    docusate sodium (COLACE ORAL) Take 1 capsule by mouth if needed (constipation).      MINOXIDIL TOP Apply topically.      naproxen sodium (Aleve) 220 mg tablet Take 1 tablet (220 mg) by mouth every 12 hours if needed for mild pain (1 - 3).      traZODone (Desyrel) 50 mg tablet Take by mouth.      [DISCONTINUED] DULoxetine (Cymbalta) 30 mg DR capsule Take 1 capsule (30 mg) by mouth once daily. Do not crush or chew. Start taking during the last days of Zoloft at 50mg dose. 60 capsule 0    propranolol (Inderal) 10 mg tablet Take 1 tablet (10 mg) by mouth 2 times a day. 60 tablet 2     No current facility-administered medications on file prior to visit.       Lab Review:   not applicable      Time Spent:    Prep time: 1 min.  Direct patient time: 26 min.  Documentation time: 6 min.  Total time: 33 min.    Next Appointment:  Follow up in 7 weeks (on 7/10/2025).

## 2025-05-23 ENCOUNTER — PROCEDURE VISIT (OUTPATIENT)
Dept: SLEEP MEDICINE | Facility: HOSPITAL | Age: 50
End: 2025-05-23
Payer: COMMERCIAL

## 2025-05-23 DIAGNOSIS — G47.30 SLEEP APNEA, UNSPECIFIED TYPE: ICD-10-CM

## 2025-05-23 PROCEDURE — 95806 SLEEP STUDY UNATT&RESP EFFT: CPT | Performed by: STUDENT IN AN ORGANIZED HEALTH CARE EDUCATION/TRAINING PROGRAM

## 2025-05-25 LAB
25(OH)D3+25(OH)D2 SERPL-MCNC: 47 NG/ML (ref 30–100)
ALBUMIN SERPL-MCNC: 4.6 G/DL (ref 3.6–5.1)
ALP SERPL-CCNC: 69 U/L (ref 31–125)
ALT SERPL-CCNC: 15 U/L (ref 6–29)
ANION GAP SERPL CALCULATED.4IONS-SCNC: 9 MMOL/L (CALC) (ref 7–17)
APPEARANCE UR: CLEAR
AST SERPL-CCNC: 17 U/L (ref 10–35)
BILIRUB SERPL-MCNC: 0.4 MG/DL (ref 0.2–1.2)
BILIRUB UR QL STRIP: NEGATIVE
BUN SERPL-MCNC: 13 MG/DL (ref 7–25)
CALCIUM SERPL-MCNC: 9.6 MG/DL (ref 8.6–10.2)
CHLORIDE SERPL-SCNC: 101 MMOL/L (ref 98–110)
CHOLEST SERPL-MCNC: 213 MG/DL
CHOLEST/HDLC SERPL: 3.3 (CALC)
CO2 SERPL-SCNC: 27 MMOL/L (ref 20–32)
COLOR UR: YELLOW
CREAT SERPL-MCNC: 0.79 MG/DL (ref 0.5–0.99)
EGFRCR SERPLBLD CKD-EPI 2021: 93 ML/MIN/1.73M2
ERYTHROCYTE [DISTWIDTH] IN BLOOD BY AUTOMATED COUNT: 12.4 % (ref 11–15)
GLUCOSE SERPL-MCNC: 86 MG/DL (ref 65–99)
GLUCOSE UR QL STRIP: NEGATIVE
HCT VFR BLD AUTO: 43.1 % (ref 35–45)
HDLC SERPL-MCNC: 65 MG/DL
HGB BLD-MCNC: 13.9 G/DL (ref 11.7–15.5)
HGB UR QL STRIP: NEGATIVE
KETONES UR QL STRIP: NEGATIVE
LDLC SERPL CALC-MCNC: 123 MG/DL (CALC)
LEUKOCYTE ESTERASE UR QL STRIP: NEGATIVE
MCH RBC QN AUTO: 29.2 PG (ref 27–33)
MCHC RBC AUTO-ENTMCNC: 32.3 G/DL (ref 32–36)
MCV RBC AUTO: 90.5 FL (ref 80–100)
NITRITE UR QL STRIP: NEGATIVE
NONHDLC SERPL-MCNC: 148 MG/DL (CALC)
PH UR STRIP: 7 [PH] (ref 5–8)
PLATELET # BLD AUTO: 348 THOUSAND/UL (ref 140–400)
PMV BLD REES-ECKER: 9.8 FL (ref 7.5–12.5)
POTASSIUM SERPL-SCNC: 4.3 MMOL/L (ref 3.5–5.3)
PROT SERPL-MCNC: 7.6 G/DL (ref 6.1–8.1)
PROT UR QL STRIP: NEGATIVE
RBC # BLD AUTO: 4.76 MILLION/UL (ref 3.8–5.1)
SODIUM SERPL-SCNC: 137 MMOL/L (ref 135–146)
SP GR UR STRIP: 1 (ref 1–1.03)
T3 SERPL-MCNC: 129 NG/DL (ref 76–181)
TRIGL SERPL-MCNC: 131 MG/DL
TSH SERPL-ACNC: 0.43 MIU/L
TSI SER-ACNC: NORMAL
WBC # BLD AUTO: 5.1 THOUSAND/UL (ref 3.8–10.8)

## 2025-05-29 PROCEDURE — RXMED WILLOW AMBULATORY MEDICATION CHARGE

## 2025-05-30 ENCOUNTER — PHARMACY VISIT (OUTPATIENT)
Dept: PHARMACY | Facility: CLINIC | Age: 50
End: 2025-05-30
Payer: COMMERCIAL

## 2025-05-30 LAB
T3 SERPL-MCNC: 129 NG/DL (ref 76–181)
TSH SERPL-ACNC: 0.43 MIU/L
TSI SER-ACNC: <89 % BASELINE

## 2025-06-12 ENCOUNTER — APPOINTMENT (OUTPATIENT)
Dept: ENDOCRINOLOGY | Facility: CLINIC | Age: 50
End: 2025-06-12
Payer: COMMERCIAL

## 2025-06-12 VITALS
WEIGHT: 171 LBS | SYSTOLIC BLOOD PRESSURE: 110 MMHG | BODY MASS INDEX: 25.91 KG/M2 | HEIGHT: 68 IN | DIASTOLIC BLOOD PRESSURE: 62 MMHG

## 2025-06-12 DIAGNOSIS — E04.1 THYROID NODULE: Primary | ICD-10-CM

## 2025-06-12 DIAGNOSIS — R00.2 PALPITATIONS: ICD-10-CM

## 2025-06-12 PROCEDURE — 3008F BODY MASS INDEX DOCD: CPT | Performed by: HOSPITALIST

## 2025-06-12 PROCEDURE — 99214 OFFICE O/P EST MOD 30 MIN: CPT | Performed by: HOSPITALIST

## 2025-06-12 ASSESSMENT — ENCOUNTER SYMPTOMS
AGITATION: 0
FREQUENCY: 0
NERVOUS/ANXIOUS: 0
ABDOMINAL PAIN: 0
CHEST TIGHTNESS: 0
PHOTOPHOBIA: 0
DIARRHEA: 0
CONSTITUTIONAL NEGATIVE: 1
ARTHRALGIAS: 0
SLEEP DISTURBANCE: 0
PALPITATIONS: 1
VOICE CHANGE: 0
TROUBLE SWALLOWING: 0
SORE THROAT: 0
DYSURIA: 0
NAUSEA: 0
TREMORS: 0
CONSTIPATION: 0
SHORTNESS OF BREATH: 0
HEADACHES: 0
ABDOMINAL DISTENTION: 0
LIGHT-HEADEDNESS: 0
EYE ITCHING: 0
VOMITING: 0

## 2025-06-12 NOTE — PROGRESS NOTES
Subjective   Patient ID:     Patient ID: Suzy Bah is a 49 y.o. female who presents for Hyperthyroidism (PCP: Gross/Dx toxic adenoma 2023 /[facility] thyroid: none known). Not on any Thyroid mediction  Lab Results   Component Value Date    TSH 0.43 05/24/2025      HPI    See AP     Review of Systems   Constitutional: Negative.    HENT:  Negative for sore throat, trouble swallowing and voice change.    Eyes:  Negative for photophobia, itching and visual disturbance.   Respiratory:  Negative for chest tightness and shortness of breath.    Cardiovascular:  Positive for palpitations. Negative for chest pain.   Gastrointestinal:  Negative for abdominal distention, abdominal pain, constipation, diarrhea, nausea and vomiting.   Endocrine: Negative for cold intolerance, heat intolerance and polyuria.   Genitourinary:  Negative for dysuria and frequency.   Musculoskeletal:  Negative for arthralgias.   Skin:  Negative for pallor.   Allergic/Immunologic: Negative for environmental allergies.   Neurological:  Negative for tremors, light-headedness and headaches.   Psychiatric/Behavioral:  Negative for agitation and sleep disturbance. The patient is not nervous/anxious.        Objective   Physical Exam  Constitutional:       Appearance: Normal appearance.   HENT:      Head: Normocephalic.      Nose: Nose normal.      Mouth/Throat:      Mouth: Mucous membranes are moist.   Eyes:      Extraocular Movements: Extraocular movements intact.   Cardiovascular:      Rate and Rhythm: Normal rate.   Pulmonary:      Effort: Pulmonary effort is normal. No respiratory distress.   Abdominal:      General: There is no distension.   Musculoskeletal:         General: Normal range of motion.      Cervical back: Normal range of motion and neck supple.   Skin:     General: Skin is warm and dry.   Neurological:      Mental Status: She is alert and oriented to person, place, and time.   Psychiatric:         Mood and Affect: Mood normal.      Visit  "Vitals  /62   Ht 1.715 m (5' 7.5\")   Wt 77.6 kg (171 lb)   BMI 26.39 kg/m²   Smoking Status Never   BSA 1.92 m²        Assessment/Plan   Diagnoses and all orders for this visit:  Abnormal TSH  Enlarged thyroid gland              49 yo woman came to [facility] care for toxic adenoma.       She mentions she initially started having symptoms of hyperthyroidism early 2022- fatigue weight loss, palpitations.   Jan 2022 TSH 0.27 nl free T4 and T3   US thyroid 2/ 2022- [facility] with dominant left thyroid nodule ~ 2.9 cm   FNA biopsy left nodule- 3/ 2022- benign follicular nodule.   She saw Dr. Gutierrez 4/ 2022  UPTAKE scan 6 / 2022 - anterior neck uptake 27 %   Increased uptake mid pole left thyroid - likely hot nodule ( the one that was biopsied)    TSI ab neg   She mentions she was clinically observed , no BB or MMI or ROWE      Currently she mentions she fels well, she complains of palpitations 1-3 times a week  It was daily in past   She mentions energy is fair. She has regular menstruations. Sleep - okay, no cramps .       10/ 2023- US thyroid -[facility] : R- 0.9 cm TR 3- not changed , left mid pole 1.4 cm TR 2 , no sig change, L inf lobe ~ 2.6 cm no sig change TR 4   11/28/23- TSH 0.97 free T4 0.58 T3- 3.2   6/5/2024 TSH 0.68 , free T4 was 0.69, T3 was 94    No biotin use now. Stopped in past    5/24/25 TSH 0.43  T3 is 129     Ultrasound thyroid 11/2024 right lobe hypoechoic solid nodule 8 mm  Left lobe 16 mm TR 5 solid nodule with calcification, another solid nodule 24 mm TR 5 smaller than previous ultrasound (midpole left lobe increased uptake and nuclear medicine scan in past, hot nodule)     Denies any eye symptoms   Clinically euthyroid       PLAN :   -Repeat ultrasound again in November 2025  -She has not started propranolol advised to try propranolol 10 mg daily or twice as needed if needed for  palpitations  -Repeat blood work TFT.  Today    - discussed continued clinical and biochemical follow up   - advised continuing HR " monitoring   - discussed possible ROWE in case of symptoms of hyperthyroidism  - discussed NH of toxic adenoma.      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

## 2025-06-15 DIAGNOSIS — G47.33 OBSTRUCTIVE SLEEP APNEA SYNDROME: Primary | ICD-10-CM

## 2025-06-15 NOTE — RESULT ENCOUNTER NOTE
Please call the patient regarding her abnormal result.  Recent sleep study confirmed obstructive sleep apnea. Please consult sleep medicine for further evaluation and treatment recommendations.  I placed referral in our system, please schedule appt.  Dr. Gustafson

## 2025-07-01 ENCOUNTER — APPOINTMENT (OUTPATIENT)
Dept: OPHTHALMOLOGY | Facility: CLINIC | Age: 50
End: 2025-07-01
Payer: COMMERCIAL

## 2025-07-01 DIAGNOSIS — H52.12 MYOPIA OF LEFT EYE: ICD-10-CM

## 2025-07-01 DIAGNOSIS — H52.223 REGULAR ASTIGMATISM OF BOTH EYES: Primary | ICD-10-CM

## 2025-07-01 DIAGNOSIS — H16.223 BILATERAL KERATOCONJUNCTIVITIS SICCA: ICD-10-CM

## 2025-07-01 DIAGNOSIS — H16.223 KERATOCONJUNCTIVITIS SICCA OF BOTH EYES NOT SPECIFIED AS SJOGREN'S: Primary | ICD-10-CM

## 2025-07-01 DIAGNOSIS — Z98.890 HISTORY OF PHOTOREFRACTIVE KERATECTOMY (PRK): ICD-10-CM

## 2025-07-01 DIAGNOSIS — H52.4 PRESBYOPIA: ICD-10-CM

## 2025-07-01 DIAGNOSIS — H52.01 HYPERMETROPIA OF RIGHT EYE: ICD-10-CM

## 2025-07-01 PROCEDURE — 92015 DETERMINE REFRACTIVE STATE: CPT | Performed by: OPTOMETRIST

## 2025-07-01 PROCEDURE — RXMED WILLOW AMBULATORY MEDICATION CHARGE

## 2025-07-01 PROCEDURE — 92004 COMPRE OPH EXAM NEW PT 1/>: CPT | Performed by: OPTOMETRIST

## 2025-07-01 RX ORDER — CYCLOSPORINE 0.5 MG/ML
EMULSION OPHTHALMIC
Qty: 180 EACH | Refills: 3 | Status: SHIPPED | OUTPATIENT
Start: 2025-07-01 | End: 2026-07-01

## 2025-07-01 ASSESSMENT — REFRACTION_WEARINGRX
OD_SPHERE: +1.50
OD_CYLINDER: -0.50
OS_AXIS: 090
OD_AXIS: 110
SPECS_TYPE: SVR
OS_SPHERE: +1.25
OS_CYLINDER: -0.50

## 2025-07-01 ASSESSMENT — TONOMETRY
OD_IOP_MMHG: 13
IOP_METHOD: GOLDMANN APPLANATION
OS_IOP_MMHG: 13

## 2025-07-01 ASSESSMENT — CONF VISUAL FIELD
OS_NORMAL: 1
OS_INFERIOR_NASAL_RESTRICTION: 0
OD_INFERIOR_NASAL_RESTRICTION: 0
OD_NORMAL: 1
OS_SUPERIOR_TEMPORAL_RESTRICTION: 0
OD_INFERIOR_TEMPORAL_RESTRICTION: 0
OS_SUPERIOR_NASAL_RESTRICTION: 0
OD_SUPERIOR_NASAL_RESTRICTION: 0
OD_SUPERIOR_TEMPORAL_RESTRICTION: 0
OS_INFERIOR_TEMPORAL_RESTRICTION: 0

## 2025-07-01 ASSESSMENT — REFRACTION_MANIFEST
OD_ADD: +1.75
OS_CYLINDER: -0.50
OD_CYLINDER: -0.75
OS_ADD: +1.75
OD_AXIS: 110
OS_AXIS: 090
OD_SPHERE: +0.00
OS_SPHERE: -1.00

## 2025-07-01 ASSESSMENT — VISUAL ACUITY
METHOD: SNELLEN - LINEAR
OS_CC: J1+
OS_SC: 20/40
OD_SC: 20/25
OD_CC: J1+

## 2025-07-01 ASSESSMENT — REFRACTION
OS_CYLINDER: -0.50
OS_AXIS: 090
OD_CYLINDER: -0.75
OD_AXIS: 110
OD_SPHERE: +0.25
OD_ADD: +1.75
OS_SPHERE: -0.50
OS_ADD: +1.75

## 2025-07-01 ASSESSMENT — ENCOUNTER SYMPTOMS
NEUROLOGICAL NEGATIVE: 0
HEMATOLOGIC/LYMPHATIC NEGATIVE: 0
ENDOCRINE NEGATIVE: 0
MUSCULOSKELETAL NEGATIVE: 0
EYES NEGATIVE: 1
GASTROINTESTINAL NEGATIVE: 0
CONSTITUTIONAL NEGATIVE: 0
PSYCHIATRIC NEGATIVE: 0
CARDIOVASCULAR NEGATIVE: 0
RESPIRATORY NEGATIVE: 0
ALLERGIC/IMMUNOLOGIC NEGATIVE: 0

## 2025-07-01 ASSESSMENT — SLIT LAMP EXAM - LIDS
COMMENTS: NORMAL
COMMENTS: NORMAL

## 2025-07-01 ASSESSMENT — CUP TO DISC RATIO
OD_RATIO: 0.1
OS_RATIO: 0.15

## 2025-07-01 ASSESSMENT — EXTERNAL EXAM - RIGHT EYE: OD_EXAM: NORMAL

## 2025-07-01 ASSESSMENT — EXTERNAL EXAM - LEFT EYE: OS_EXAM: NORMAL

## 2025-07-01 NOTE — PROGRESS NOTES
Assessment/Plan   Diagnoses and all orders for this visit:  Regular astigmatism of both eyes  Presbyopia  Hypermetropia of right eye  Myopia of left eye  New spec rx released today per patient request. Ocular health wnl for age OU. Monitor 1 year or sooner prn. Refraction billed today. Pt consents to receiving glasses Rx today. Patient's/guardian's signature obtained to acknowledge and confirm that a paper copy of glasses Rx was given to patient in compliance with [facility] Eyeglass Rule. Electronic copy of Rx will also be available via [email or web address]/EPIC.   Discussed will update balance in computer/reading glasses.     History of photorefractive keratectomy (PRK)  Monitor.     Bilateral keratoconjunctivitis sicca  Doing well w/ restasis/cyclosporine bid OU. Will continue. Consider xiidra, cequa, plugs if further intervention needed in future.

## 2025-07-07 ENCOUNTER — PHARMACY VISIT (OUTPATIENT)
Dept: PHARMACY | Facility: CLINIC | Age: 50
End: 2025-07-07
Payer: COMMERCIAL

## 2025-07-10 ENCOUNTER — APPOINTMENT (OUTPATIENT)
Dept: BEHAVIORAL HEALTH | Facility: CLINIC | Age: 50
End: 2025-07-10
Payer: COMMERCIAL

## 2025-07-10 DIAGNOSIS — E05.10 TOXIC ADENOMA: ICD-10-CM

## 2025-07-10 DIAGNOSIS — F41.1 GAD (GENERALIZED ANXIETY DISORDER): Primary | ICD-10-CM

## 2025-07-10 DIAGNOSIS — F33.1 MODERATE EPISODE OF RECURRENT MAJOR DEPRESSIVE DISORDER: ICD-10-CM

## 2025-07-10 DIAGNOSIS — F42.8 OBSESSIVE THINKING: ICD-10-CM

## 2025-07-10 PROCEDURE — 99214 OFFICE O/P EST MOD 30 MIN: CPT | Performed by: NURSE PRACTITIONER

## 2025-07-10 PROCEDURE — RXMED WILLOW AMBULATORY MEDICATION CHARGE

## 2025-07-10 RX ORDER — DULOXETIN HYDROCHLORIDE 30 MG/1
30 CAPSULE, DELAYED RELEASE ORAL DAILY
Qty: 90 CAPSULE | Refills: 3 | Status: SHIPPED | OUTPATIENT
Start: 2025-07-10 | End: 2026-07-10

## 2025-07-10 ASSESSMENT — PATIENT HEALTH QUESTIONNAIRE - PHQ9
9. THOUGHTS THAT YOU WOULD BE BETTER OFF DEAD, OR OF HURTING YOURSELF: NOT AT ALL
10. IF YOU CHECKED OFF ANY PROBLEMS, HOW DIFFICULT HAVE THESE PROBLEMS MADE IT FOR YOU TO DO YOUR WORK, TAKE CARE OF THINGS AT HOME, OR GET ALONG WITH OTHER PEOPLE: SOMEWHAT DIFFICULT
4. FEELING TIRED OR HAVING LITTLE ENERGY: NEARLY EVERY DAY
6. FEELING BAD ABOUT YOURSELF - OR THAT YOU ARE A FAILURE OR HAVE LET YOURSELF OR YOUR FAMILY DOWN: NOT AT ALL
8. MOVING OR SPEAKING SO SLOWLY THAT OTHER PEOPLE COULD HAVE NOTICED. OR THE OPPOSITE, BEING SO FIGETY OR RESTLESS THAT YOU HAVE BEEN MOVING AROUND A LOT MORE THAN USUAL: NOT AT ALL
7. TROUBLE CONCENTRATING ON THINGS, SUCH AS READING THE NEWSPAPER OR WATCHING TELEVISION: NOT AT ALL
2. FEELING DOWN, DEPRESSED OR HOPELESS: NOT AT ALL
3. TROUBLE FALLING OR STAYING ASLEEP OR SLEEPING TOO MUCH: MORE THAN HALF THE DAYS
1. LITTLE INTEREST OR PLEASURE IN DOING THINGS: SEVERAL DAYS
5. POOR APPETITE OR OVEREATING: SEVERAL DAYS

## 2025-07-10 ASSESSMENT — ANXIETY QUESTIONNAIRES
GAD7 TOTAL SCORE: 4
IF YOU CHECKED OFF ANY PROBLEMS ON THIS QUESTIONNAIRE, HOW DIFFICULT HAVE THESE PROBLEMS MADE IT FOR YOU TO DO YOUR WORK, TAKE CARE OF THINGS AT HOME, OR GET ALONG WITH OTHER PEOPLE: SOMEWHAT DIFFICULT
3. WORRYING TOO MUCH ABOUT DIFFERENT THINGS: SEVERAL DAYS
1. FEELING NERVOUS, ANXIOUS, OR ON EDGE: NOT AT ALL
5. BEING SO RESTLESS THAT IT IS HARD TO SIT STILL: NOT AT ALL
2. NOT BEING ABLE TO STOP OR CONTROL WORRYING: SEVERAL DAYS
7. FEELING AFRAID AS IF SOMETHING AWFUL MIGHT HAPPEN: NOT AT ALL
6. BECOMING EASILY ANNOYED OR IRRITABLE: SEVERAL DAYS
4. TROUBLE RELAXING: SEVERAL DAYS

## 2025-07-10 NOTE — PROGRESS NOTES
"Adult Ambulatory Psychiatry Progress Note      Assessment/Plan     Impression:  Suzy Bah is a 50 y.o. 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 Same old - work, home, nothing major. The tiredness and the get up and go is still the same for me, but I am feeling ok.\"    Plan: Patient was calm, and engaging. Was recently diagnoses with ALEJANDRO so has identified a source of her sleep issues and now waiting for a CPAP. Only current stressors were monetary and needing to have her parents stay with her while they prepare to sell their home before they move permanently to their home in [place]. Otherwise reports and presents herself to be stable. Reviewed and agreed to leaving medications and treatment plan in place. Encouraged patient to still seek out a therapist when ready.    Medication: Cymbalta 30mg every day. Trazodone 25-50mg PRN/HS, Wellbutrin XL 450mg every day.     Patient is reminded that if there is SI to call [phone] and get themselves to the closest ED for evaluation, otherwise contact me for other questions/concerns.     Diagnoses and all orders for this visit:  TRACI (generalized anxiety disorder)  -     DULoxetine (Cymbalta) 30 mg DR capsule; Take 1 capsule (30 mg) by mouth once daily. Do not crush or chew. Start taking during the last days of Zoloft at 50mg dose.  Moderate episode of recurrent major depressive disorder  -     DULoxetine (Cymbalta) 30 mg DR capsule; Take 1 capsule (30 mg) by mouth once daily. Do not crush or chew. Start taking during the last days of Zoloft at 50mg dose.  Obsessive thinking  -     DULoxetine (Cymbalta) 30 mg DR capsule; Take 1 capsule (30 mg) by mouth once daily. Do not crush or chew. Start taking during the last days of Zoloft at 50mg dose.  Toxic adenoma        Therapy: none    Other: n/a    Patient is reminded that if there is SI to call [phone] and get themselves to the " closest ED for evaluation, otherwise contact me for other questions/concerns.     Subjective   HPI:     Virtual Consent    An interactive audio and video telecommunication system which permits real time communications between the patient (at work) and provider (at home office) was utilized to provide this telehealth service.   Verbal consent was requested and obtained from Suzy Bah on this date, 07/10/2025 for a telehealth visit.    Present Illness - depression, sleep difficulties     Characteristics/Recent psychiatric symptoms (pertinent positives and negatives) - reports some stress related to increased financial concerns - needed to get a new HVAC installed for her condo and also her parents are living with her temporarily until their home is sold up here and they move to their home they already own in [facility]. Admits she feels her home is 'a little smaller with the walls encroaching on me.' Reveals she was recently dx with mild to moderate ALEJANDRO and will see the sleep medicine to see about a CPAP in Sept. Reports overall anxiety is 'pretty chill right now.' Reports her depression is 'stable and not necessarily having bad days. It is even keeled.' Admits her daytime energy levels and mental/physical fatigue issues are more than likely tied to her ALEJANDRO dx. Reports ADHD paralysis has pretty much resolved itself. Reports no change with her sleep - still struggles with getting up in the AM during work week (5-7 hrs) and on weekends will oversleep/lays in bed for longer hours (8-12 hrs). Appetite is 'still poor. I still have 3 meals a day, but sometimes after work, I am just not that hungry. But breakfast and lunch are ok.' Reports not experiencing any racing, obsessive, ruminating, or intrusive thoughts as she recalls how they used to be and admits if they start up, she is able to push through those thoughts quickly and move on.       Onset/timeframe - 1 month  Type - depression, sleep, anxiety  Duration -  situational  Aggravating and/or relieving factors/triggers - overall mood is better, but sleep still is all over the map (less on weeknights and more on weekends) - recently dx with ALEJANDRO so waiting for CPAP, but a little stressed with $$ (had to buy a new HVAC unexpectedly) and parents temporarily living with her, leaving her feel like she has no personal space.  Treatment and treatment changes (new meds, dosage increases or decreases, med compliance, therapy frequency, etc.) (Past and Recent) - Cymbalta 30mg QD, Trazodone 25-50mg PRN, Wellbutrin XL 300mg QD. Still looking for therapist.     Issues: Denies SI/HI/AVH currently.      Review of Systems   Psychiatric/Behavioral:  Positive for sleep disturbance.        Objective   Mental Status Exam:  General Appearance: Well groomed, appropriate eye contact  Attitude/Behavior: Cooperative  Motor: No psychomotor agitation or retardation, no tremor or other abnormal movements  Speech: Normal rate, volume, prosody  Gait/Station: Other:(comment) (sitting on couch at work, over virtual connection)  Mood: 'good just a little tired'  Affect: Euthymic, full-range, Congruent with mood and topic of conversation, Other: (comment) (slightly tired)  Thought Process: Linear, goal directed  Thought Associations: No loosening of associations  Thought Content: Normal, Other: (comment) (financial strain currently, parents temporarily living w/ her while they prepare to sell their home in [facility] [facility], before moving permanently to [place] is cramping in on her personal space which leaves her feeling worn out but overall feels stable)  Perception: No perceptual abnormalities noted  Sensorium: Alert and oriented to person, place, time and situation  Insight: Intact  Judgement: Intact  Cognition: Cognitively intact to conversational testing with respect to attention, orientation, fund of knowledge, recent and remote memory, and language  Testing: N/A    TRACI-7/PHQ-9 scores reviewed: 4, 7 compared to 4, 9  reflecting stable anxiety and further yet small improvement in depression.    Current Medications:  Current Outpatient Medications on File Prior to Visit   Medication Sig Dispense Refill    aspirin 81 mg EC tablet Take 1 tablet (81 mg) by mouth 1 time.      ASPIRIN-ACETAMINOPHEN-CAFFEINE ORAL Take by mouth if needed. for migraine      buPROPion XL (Wellbutrin XL) 150 mg 24 hr tablet Take 1 tablet (150 mg) by mouth once daily. To be taken with current 300mg dose for a total of 450mg daily. 90 tablet 3    buPROPion XL (Wellbutrin XL) 300 mg 24 hr tablet Take 1 tablet (300 mg) by mouth once daily. 90 tablet 3    cholecalciferol (Vitamin D3) 25 MCG (1000 UT) capsule Take 1 capsule (25 mcg) by mouth 2 times a day.      cyanocobalamin (Vitamin B-12) 1,000 mcg tablet Take 100 mcg by mouth once daily.      cycloSPORINE (Restasis) 0.05 % ophthalmic emulsion Place 1 drop in both eyes 2 times a day as directed. 180 each 3    docusate sodium (COLACE ORAL) Take 1 capsule by mouth if needed (constipation).      MINOXIDIL TOP Apply topically.      naproxen sodium (Aleve) 220 mg tablet Take 1 tablet (220 mg) by mouth every 12 hours if needed for mild pain (1 - 3).      traZODone (Desyrel) 50 mg tablet Take by mouth.      [DISCONTINUED] DULoxetine (Cymbalta) 30 mg DR capsule Take 1 capsule (30 mg) by mouth once daily. Do not crush or chew. Start taking during the last days of Zoloft at 50mg dose. 60 capsule 0    propranolol (Inderal) 10 mg tablet Take 1 tablet (10 mg) by mouth 2 times a day. 60 tablet 2     No current facility-administered medications on file prior to visit.       Lab Review:   not applicable    Risk Assessment:  Risk of harm to self: Low Risk -- Risk factors include: No significant risk factors identified on screening Protective factors include:Denies current suicidal ideation and Denies history of suicide attempts     Risk of harm to others: Low Risk - Risk factors include: No significant risk factors identified  on screening. Protective factors include: Lack of known history of harm to others  and Lack of known history of violent ideation       Time Spent:    Prep time: 1 min.  Direct patient time: 30 min.  Documentation time: 6 min.  Total time: 37 min.    Next Appointment:  Follow up in 18 weeks (on 11/13/2025).

## 2025-07-11 ENCOUNTER — PHARMACY VISIT (OUTPATIENT)
Dept: PHARMACY | Facility: CLINIC | Age: 50
End: 2025-07-11
Payer: COMMERCIAL

## 2025-07-13 ASSESSMENT — ENCOUNTER SYMPTOMS: SLEEP DISTURBANCE: 1

## 2025-07-28 PROCEDURE — RXMED WILLOW AMBULATORY MEDICATION CHARGE

## 2025-08-01 ENCOUNTER — PHARMACY VISIT (OUTPATIENT)
Dept: PHARMACY | Facility: CLINIC | Age: 50
End: 2025-08-01
Payer: COMMERCIAL

## 2025-09-23 ENCOUNTER — APPOINTMENT (OUTPATIENT)
Facility: CLINIC | Age: 50
End: 2025-09-23
Payer: COMMERCIAL

## 2025-11-13 ENCOUNTER — APPOINTMENT (OUTPATIENT)
Dept: BEHAVIORAL HEALTH | Facility: CLINIC | Age: 50
End: 2025-11-13
Payer: COMMERCIAL

## 2025-12-17 ENCOUNTER — APPOINTMENT (OUTPATIENT)
Dept: ENDOCRINOLOGY | Facility: CLINIC | Age: 50
End: 2025-12-17
Payer: COMMERCIAL

## 2026-06-24 ENCOUNTER — APPOINTMENT (OUTPATIENT)
Dept: ENDOCRINOLOGY | Facility: CLINIC | Age: 51
End: 2026-06-24
Payer: COMMERCIAL

## 2026-07-23 ENCOUNTER — APPOINTMENT (OUTPATIENT)
Dept: OPHTHALMOLOGY | Facility: CLINIC | Age: 51
End: 2026-07-23
Payer: COMMERCIAL